# Patient Record
Sex: FEMALE | Race: WHITE | NOT HISPANIC OR LATINO | Employment: FULL TIME | URBAN - NONMETROPOLITAN AREA
[De-identification: names, ages, dates, MRNs, and addresses within clinical notes are randomized per-mention and may not be internally consistent; named-entity substitution may affect disease eponyms.]

---

## 2020-03-27 ENCOUNTER — APPOINTMENT (EMERGENCY)
Dept: RADIOLOGY | Facility: HOSPITAL | Age: 40
End: 2020-03-27
Payer: COMMERCIAL

## 2020-03-27 ENCOUNTER — HOSPITAL ENCOUNTER (EMERGENCY)
Facility: HOSPITAL | Age: 40
Discharge: HOME | End: 2020-03-27
Attending: EMERGENCY MEDICINE | Admitting: EMERGENCY MEDICINE
Payer: COMMERCIAL

## 2020-03-27 VITALS
HEART RATE: 86 BPM | DIASTOLIC BLOOD PRESSURE: 78 MMHG | OXYGEN SATURATION: 99 % | RESPIRATION RATE: 18 BRPM | WEIGHT: 230 LBS | HEIGHT: 66 IN | BODY MASS INDEX: 36.96 KG/M2 | TEMPERATURE: 97.8 F | SYSTOLIC BLOOD PRESSURE: 128 MMHG

## 2020-03-27 DIAGNOSIS — S82.832A CLOSED FRACTURE OF HEAD OF LEFT FIBULA, INITIAL ENCOUNTER: ICD-10-CM

## 2020-03-27 DIAGNOSIS — S82.892A CLOSED FRACTURE OF LEFT ANKLE, INITIAL ENCOUNTER: Primary | ICD-10-CM

## 2020-03-27 PROCEDURE — 73610 X-RAY EXAM OF ANKLE: CPT

## 2020-03-27 PROCEDURE — 73564 X-RAY EXAM KNEE 4 OR MORE: CPT

## 2020-03-27 PROCEDURE — 29515 APPLICATION SHORT LEG SPLINT: CPT | Performed by: EMERGENCY MEDICINE

## 2020-03-27 PROCEDURE — 99283 EMERGENCY DEPT VISIT LOW MDM: CPT

## 2020-03-27 PROCEDURE — 73630 X-RAY EXAM OF FOOT: CPT

## 2020-03-27 PROCEDURE — 99283 EMERGENCY DEPT VISIT LOW MDM: CPT | Performed by: EMERGENCY MEDICINE

## 2020-03-27 RX ORDER — BIOTIN 10 MG
TABLET ORAL
COMMUNITY

## 2020-03-27 RX ORDER — FLUTICASONE PROPIONATE 50 MCG
SPRAY, SUSPENSION (ML) NASAL
COMMUNITY
Start: 2020-02-04

## 2020-03-27 RX ORDER — FOLIC ACID 0.8 MG
500 TABLET ORAL
COMMUNITY

## 2020-03-27 RX ORDER — PROPRANOLOL HYDROCHLORIDE 20 MG/1
20 TABLET ORAL
COMMUNITY
Start: 2020-01-15

## 2020-03-27 RX ORDER — SUMATRIPTAN 6 MG/.5ML
INJECTION, SOLUTION SUBCUTANEOUS
COMMUNITY

## 2020-03-28 NOTE — ED PROVIDER NOTES
History  Chief Complaint   Patient presents with    Ankle Injury     pt  fell down 6 wooden steps tonight, denies LOC, denies hitting head, pt  was walking down steps and she felt her ankle gave out, causing her to slid down steps with her right leg underneat her, hitting her knee and shin off of steps, pain to inside of foot and achilles, unable to bear weight, swollen, no obvious defomity, pt  took 4 tabs of 500 mg acetaminophen ES prior to arrival     Patient is a 68-year-old female presents the emergency department after she tripped and fell while walking down steps twisting her left knee and injuring her left ankle and foot patient denies any hit on head or loss of consciousness no other injury  History provided by:  Patient  Fall   Mechanism of injury: fall    Injury location:  Leg  Leg injury location:  L knee, L ankle and L foot  Time since incident:  1 hour  Fall:     Fall occurred:  Down stairs    Entrapped after fall: no    Suspicion of alcohol use: no    Suspicion of drug use: no    Prior to arrival data:     Responsiveness at scene:  Alert    Orientation at scene:  Person, place, situation and time    Loss of consciousness: no      Amnesic to event: no    Associated symptoms: no abdominal pain, no chest pain, no headaches, no nausea and no vomiting        Prior to Admission Medications   Prescriptions Last Dose Informant Patient Reported? Taking?    Echinacea-Goldenseal (ECHINACEA COMB/REHMAN SEAL) CAPS   Yes No   Sig: Take by mouth   Magnesium 500 MG CAPS   Yes No   Sig: Take 500 mg by mouth   Multiple Vitamins-Minerals (MULTIVITAMIN ADULT) CHEW   Yes No   Sig: Chew   Riboflavin 100 MG CAPS   Yes No   Sig: Take 100 mg by mouth   SUMAtriptan (Imitrex) 6 mg/0 5 mL   Yes No   fluticasone (FLONASE) 50 mcg/act nasal spray   Yes Yes   Sig: SPRAY 2 SPRAYS INTO EACH NOSTRIL EVERY DAY   propranolol (INDERAL) 20 mg tablet   Yes Yes   Sig: Take 20 mg by mouth      Facility-Administered Medications: None Past Medical History:   Diagnosis Date    Hypertension     Migraines        Past Surgical History:   Procedure Laterality Date    TUBAL LIGATION         History reviewed  No pertinent family history  I have reviewed and agree with the history as documented  E-Cigarette/Vaping    E-Cigarette Use Never User      E-Cigarette/Vaping Substances    Nicotine No     THC No     CBD No     Flavoring No     Other No      Social History     Tobacco Use    Smoking status: Never Smoker    Smokeless tobacco: Never Used   Substance Use Topics    Alcohol use: Yes     Frequency: Monthly or less     Drinks per session: 1 or 2     Binge frequency: Never    Drug use: Never       Review of Systems   Constitutional: Negative for activity change, appetite change, chills, fatigue and fever  HENT: Negative for congestion, ear pain, rhinorrhea and sore throat  Eyes: Negative for discharge, redness and visual disturbance  Respiratory: Negative for cough, chest tightness, shortness of breath and wheezing  Cardiovascular: Negative for chest pain and palpitations  Gastrointestinal: Negative for abdominal pain, constipation, diarrhea, nausea and vomiting  Endocrine: Negative for polydipsia and polyuria  Genitourinary: Negative for difficulty urinating, dysuria, frequency, hematuria and urgency  Musculoskeletal: Negative for arthralgias and myalgias  Left ankle foot and knee pain   Skin: Negative for color change, pallor and rash  Neurological: Negative for dizziness, weakness, light-headedness, numbness and headaches  Hematological: Negative for adenopathy  Does not bruise/bleed easily  All other systems reviewed and are negative  Physical Exam  Physical Exam   Constitutional: She is oriented to person, place, and time  She appears well-developed and well-nourished  HENT:   Head: Normocephalic and atraumatic     Right Ear: External ear normal    Left Ear: External ear normal    Nose: Nose normal    Mouth/Throat: Oropharynx is clear and moist    Eyes: Pupils are equal, round, and reactive to light  Conjunctivae and EOM are normal    Neck: Normal range of motion  Neck supple  Cardiovascular: Normal rate, regular rhythm, normal heart sounds and intact distal pulses  Pulmonary/Chest: Effort normal and breath sounds normal  No respiratory distress  She has no wheezes  She has no rales  She exhibits no tenderness  Abdominal: Soft  Bowel sounds are normal  She exhibits no distension  There is no tenderness  There is no guarding  Musculoskeletal: Normal range of motion  Left knee: She exhibits normal range of motion and no deformity  Tenderness found  Left ankle: She exhibits swelling  She exhibits normal pulse  Tenderness  Lateral malleolus and medial malleolus tenderness found  Left foot: There is tenderness  There is normal capillary refill and no deformity  Neurological: She is alert and oriented to person, place, and time  No cranial nerve deficit or sensory deficit  Skin: Skin is warm and dry  Psychiatric: She has a normal mood and affect  Nursing note and vitals reviewed        Vital Signs  ED Triage Vitals [03/27/20 2042]   Temperature Pulse Respirations Blood Pressure SpO2   (!) 97 °F (36 1 °C) 97 18 135/95 97 %      Temp Source Heart Rate Source Patient Position - Orthostatic VS BP Location FiO2 (%)   Temporal Monitor Sitting Right arm --      Pain Score       8           Vitals:    03/27/20 2042   BP: 135/95   Pulse: 97   Patient Position - Orthostatic VS: Sitting         Visual Acuity      ED Medications  Medications - No data to display    Diagnostic Studies  Results Reviewed     None                 XR ankle 3+ views LEFT   ED Interpretation by Emmett Guerra DO (03/27 2137)   Subtle avulsion fracture posterior tibial malleolus      XR foot 3+ views LEFT   ED Interpretation by Emmett Guerra DO (03/27 2137)   No fracture of foot      XR knee 4+ views left injury   ED Interpretation by Jerry David DO (03/27 2137)   Avulsion fracture proximal left fibular head                 Procedures  Splint application  Date/Time: 3/27/2020 9:41 PM  Performed by: Jerry David DO  Authorized by: Jerry David DO     Patient location:  ED  Performing Provider:  Attending  Other Assisting Provider: No    Consent:     Consent obtained:  Verbal    Consent given by:  Patient    Risks discussed:  Discoloration, numbness, pain and swelling  Universal protocol:     Procedure explained and questions answered to patient or proxy's satisfaction: yes      Patient identity confirmed:  Verbally with patient  Indication:     Indications: fracture    Pre-procedure details:     Sensation:  Normal  Procedure details:     Laterality:  Left    Location:  Leg    Leg:  L lower leg    Splint type:  Short leg    Supplies:  Ortho-Glass, cotton padding and elastic bandage  Post-procedure details:     Pain:  Improved    Sensation:  Normal    Neurovascular Exam: skin pink, capillary refill <2 sec, normal pulses and skin intact, warm, and dry      Patient tolerance of procedure: Tolerated well, no immediate complications             ED Course                                 MDM  Number of Diagnoses or Management Options  Closed fracture of head of left fibula, initial encounter: new and requires workup  Closed fracture of left ankle, initial encounter: new and requires workup  Diagnosis management comments: X-rays reveal fracture of the posterior malleolus of the left tibia as well as an avulsion at the proximal head of the left fibula  Patient is neurovascularly intact distal to injuries  Patient placed in posterior short-leg splint advised no weight-bearing on the left leg and supportive care and prompt follow-up with Orthopedics for further evaluation and treatment and obtain test results return precautions and anticipatory guidance discussed           Amount and/or Complexity of Data Reviewed  Tests in the radiology section of CPT®: ordered and reviewed  Independent visualization of images, tracings, or specimens: yes    Risk of Complications, Morbidity, and/or Mortality  Presenting problems: low  Diagnostic procedures: low  Management options: low    Patient Progress  Patient progress: stable        Disposition  Final diagnoses:   Closed fracture of left ankle, initial encounter - Posterior tibial malleolus   Closed fracture of head of left fibula, initial encounter     Time reflects when diagnosis was documented in both MDM as applicable and the Disposition within this note     Time User Action Codes Description Comment    3/27/2020  9:38 PM Jessica Albert Add [S68 623H] Closed fracture of left ankle, initial encounter     3/27/2020  9:38 PM Jessica Albert Modify [M98 124Y] Closed fracture of left ankle, initial encounter Posterior tibial malleolus    3/27/2020  9:38 PM Jessica Albert Add [X38 192P] Closed fracture of head of left fibula, initial encounter       ED Disposition     ED Disposition Condition Date/Time Comment    Discharge Stable Fri Mar 27, 2020  9:37 PM Louise Palencia discharge to home/self care              Follow-up Information     Follow up With Specialties Details Why Contact Info    Vickie Ratliff DO Family Medicine Schedule an appointment as soon as possible for a visit in 1 week  [address] Orthopedic Surgery Schedule an appointment as soon as possible for a visit in 3 days  [address] [facility] [phone]            Patient's Medications   Discharge Prescriptions    No medications on file         PDMP Review     None          ED Provider  Electronically Signed by           Leatha Bran DO  03/27/20 0762

## 2020-03-30 ENCOUNTER — OFFICE VISIT (OUTPATIENT)
Dept: OBGYN CLINIC | Facility: CLINIC | Age: 40
End: 2020-03-30
Payer: COMMERCIAL

## 2020-03-30 VITALS
BODY MASS INDEX: 37.12 KG/M2 | HEIGHT: 66 IN | DIASTOLIC BLOOD PRESSURE: 84 MMHG | SYSTOLIC BLOOD PRESSURE: 141 MMHG | TEMPERATURE: 98.3 F | HEART RATE: 78 BPM

## 2020-03-30 DIAGNOSIS — S82.392A FRACTURE, POSTERIOR MALLEOLUS, LEFT, CLOSED, INITIAL ENCOUNTER: ICD-10-CM

## 2020-03-30 DIAGNOSIS — S82.832A CLOSED FRACTURE OF HEAD OF LEFT FIBULA, INITIAL ENCOUNTER: ICD-10-CM

## 2020-03-30 DIAGNOSIS — M25.572 ACUTE LEFT ANKLE PAIN: Primary | ICD-10-CM

## 2020-03-30 DIAGNOSIS — M25.562 ACUTE PAIN OF LEFT KNEE: ICD-10-CM

## 2020-03-30 PROCEDURE — 99204 OFFICE O/P NEW MOD 45 MIN: CPT | Performed by: ORTHOPAEDIC SURGERY

## 2020-03-30 PROCEDURE — 27767 CLTX POST ANKLE FX: CPT | Performed by: ORTHOPAEDIC SURGERY

## 2020-03-30 NOTE — PROGRESS NOTES
ASSESSMENT/PLAN:    Diagnoses and all orders for this visit:    Acute left ankle pain    Acute pain of left knee    Fracture, posterior malleolus, left, closed, initial encounter    Closed fracture of head of left fibula, initial encounter        Plan:  I discussed treatment options  I think a cast would be appropriate at this time and she is in agreement that she would not likely be compliant with a walker cast boot  I would permit weight-bearing as tolerated, using crutches if needed  I will see her in 1 week for re-evaluation with repeat x-rays of her ankle in the AP and lateral planes to ensure the fracture remains nondisplaced  She was reassured that the proximal fibular fracture typically would be treated with an Ace bandage and activities as tolerated  I encouraged her to contact me if questions or concerns were to arise  Return in about 1 week (around 4/6/2020)  _____________________________________________________  CHIEF COMPLAINT:  Chief Complaint   Patient presents with    Left Lower Leg - Pain, Fracture         SUBJECTIVE:  Yumi Alcala is a 44y o  year old female who presents for evaluation of her left lower extremity  She injured herself falling down stairs at home on 03/27/2020  She was unable to tolerate weight-bearing after the fall and was seen in the emergency room at [facility], x-rays were obtained and she was placed into a posterior splint  She denies any history of prior injuries to her left ankle  She does recall a left knee injury and was treated for an ACL tear and medial collateral ligament injury  She denies any left lower extremity paresthesias at this time  She denies any additional injuries other than an area of some ecchymosis over the right proximal thigh and buttock  However, she denies any pain to this location        PAST MEDICAL HISTORY:  Past Medical History:   Diagnosis Date    Hypertension     Migraines        PAST SURGICAL HISTORY:  Past Surgical History:   Procedure Laterality Date    TUBAL LIGATION         FAMILY HISTORY:  History reviewed  No pertinent family history  SOCIAL HISTORY:  Social History     Tobacco Use    Smoking status: Never Smoker    Smokeless tobacco: Never Used   Substance Use Topics    Alcohol use: Yes     Frequency: Monthly or less     Drinks per session: 1 or 2     Binge frequency: Never    Drug use: Never       MEDICATIONS:    Current Outpatient Medications:     Echinacea-Goldenseal (ECHINACEA COMB/REHMAN SEAL) CAPS, Take by mouth, Disp: , Rfl:     fluticasone (FLONASE) 50 mcg/act nasal spray, SPRAY 2 SPRAYS INTO EACH NOSTRIL EVERY DAY, Disp: , Rfl:     Magnesium 500 MG CAPS, Take 500 mg by mouth, Disp: , Rfl:     Multiple Vitamins-Minerals (MULTIVITAMIN ADULT) CHEW, Chew, Disp: , Rfl:     propranolol (INDERAL) 20 mg tablet, Take 20 mg by mouth, Disp: , Rfl:     Riboflavin 100 MG CAPS, Take 100 mg by mouth, Disp: , Rfl:     SUMAtriptan (Imitrex) 6 mg/0 5 mL, , Disp: , Rfl:     ALLERGIES:  No Known Allergies    Review of systems:   Constitutional: Negative for fatigue, fever or loss of apetite  HENT: Negative  Respiratory: Negative for shortness of breath, dyspnea  Cardiovascular: Negative for chest pain/tightness  Gastrointestinal: Negative for abdominal pain, N/V  Endocrine: Negative for cold/heat intolerance, unexplained weight loss/gain  Genitourinary: Negative for flank pain, dysuria, hematuria  Musculoskeletal:  Positive as in the HPI   Skin: Negative for rash  Neurological:  Negative  Psychiatric/Behavioral: Negative for agitation  _____________________________________________________  PHYSICAL EXAMINATION:    Blood pressure 141/84, pulse 78, temperature 98 3 °F (36 8 °C), height 5' 6" (1 676 m)      General: well developed and well nourished, alert, oriented times 3 and appears comfortable  Psychiatric: Normal  HEENT: Benign  Cardiovascular: Regular    Pulmonary: No wheezing or stridor  Abdomen: Soft, Nontender  Skin: No masses, erythema, lacerations, fluctation, ulcerations  Neurovascular: Motor and sensory exams are grossly intact and pulses are palpable  MUSCULOSKELETAL EXAMINATION:    The left lower extremity exam demonstrates splint in place upon arrival   This was removed without difficulty  There is no significant swelling, no ecchymosis and no lacerations or abrasions noted  She has tenderness over the anterior portion of the deltoid ligament as well as over the anterior talofibular ligament  There is mild tenderness over the posterior ankle  The anterior aspect of the distal tibia is nontender  Ankle range of motion does trigger pain, especially into plantar flexion  She has good subtalar and forefoot motion without complaints and the bony and soft tissue structures throughout the foot are nontender  She denies any tenderness to palpation of the fibular shaft or proximal fibula  The medial and lateral joint lines are nontender and the femoral condyles and tibial plateau with a left knee are nontender  Valgus and varus stress does elicit some tenderness localized to the proximal left fibula  However, there is no laxity noted  _____________________________________________________  STUDIES REVIEWED:  X-rays of the knee, ankle and foot were reviewed  X-rays of the knee demonstrated fracture of the left fibular head  X-rays of the ankle and foot demonstrate evidence of a posterior malleolus fracture without displacement  The reports were reviewed      PROCEDURES:  Fracture / Dislocation Treatment  Date/Time: 3/30/2020 2:47 PM  Performed by: Hailee Moore  Authorized by: Hailee Moore     Patient Location:  Clinic  Verbal consent obtained?: Yes    Written consent obtained?: No    Risks and benefits: Risks, benefits and alternatives were discussed    Consent given by:  Patient  Patient states understanding of procedure being performed: Yes    Test results available and properly labeled: Yes    Radiology Images displayed and confirmed   If images not available, report reviewed: Yes    Injury location:  Ankle  Location details:  Left ankle  Injury type:  Fracture  Fracture type: posterior malleolus    Neurovascular status: Neurovascularly intact    Local anesthesia used?: No    Manipulation performed?: No    Cast type:  Short leg walking  Patient tolerance:  Patient tolerated the procedure well with no immediate complications          Sharath Armijo

## 2020-04-06 ENCOUNTER — APPOINTMENT (OUTPATIENT)
Dept: RADIOLOGY | Facility: CLINIC | Age: 40
End: 2020-04-06
Payer: COMMERCIAL

## 2020-04-06 ENCOUNTER — OFFICE VISIT (OUTPATIENT)
Dept: OBGYN CLINIC | Facility: CLINIC | Age: 40
End: 2020-04-06

## 2020-04-06 VITALS
TEMPERATURE: 97.6 F | SYSTOLIC BLOOD PRESSURE: 143 MMHG | DIASTOLIC BLOOD PRESSURE: 87 MMHG | HEIGHT: 66 IN | BODY MASS INDEX: 37.12 KG/M2 | HEART RATE: 81 BPM

## 2020-04-06 DIAGNOSIS — S82.392A FRACTURE, POSTERIOR MALLEOLUS, LEFT, CLOSED, INITIAL ENCOUNTER: ICD-10-CM

## 2020-04-06 DIAGNOSIS — S82.392D FRACTURE, POSTERIOR MALLEOLUS, LEFT, CLOSED, WITH ROUTINE HEALING, SUBSEQUENT ENCOUNTER: Primary | ICD-10-CM

## 2020-04-06 DIAGNOSIS — S82.832D CLOSED FRACTURE OF PROXIMAL END OF LEFT FIBULA WITH ROUTINE HEALING, UNSPECIFIED FRACTURE MORPHOLOGY, SUBSEQUENT ENCOUNTER: ICD-10-CM

## 2020-04-06 PROCEDURE — 73610 X-RAY EXAM OF ANKLE: CPT

## 2020-04-06 PROCEDURE — 99024 POSTOP FOLLOW-UP VISIT: CPT | Performed by: ORTHOPAEDIC SURGERY

## 2020-05-11 ENCOUNTER — OFFICE VISIT (OUTPATIENT)
Dept: OBGYN CLINIC | Facility: CLINIC | Age: 40
End: 2020-05-11

## 2020-05-11 ENCOUNTER — APPOINTMENT (OUTPATIENT)
Dept: RADIOLOGY | Facility: CLINIC | Age: 40
End: 2020-05-11
Payer: COMMERCIAL

## 2020-05-11 VITALS
HEART RATE: 72 BPM | SYSTOLIC BLOOD PRESSURE: 127 MMHG | DIASTOLIC BLOOD PRESSURE: 84 MMHG | HEIGHT: 66 IN | BODY MASS INDEX: 39.31 KG/M2 | RESPIRATION RATE: 18 BRPM | TEMPERATURE: 98.8 F | WEIGHT: 244.6 LBS

## 2020-05-11 DIAGNOSIS — S82.392D FRACTURE, POSTERIOR MALLEOLUS, LEFT, CLOSED, WITH ROUTINE HEALING, SUBSEQUENT ENCOUNTER: Primary | ICD-10-CM

## 2020-05-11 DIAGNOSIS — S82.832D CLOSED FRACTURE OF HEAD OF LEFT FIBULA WITH ROUTINE HEALING, SUBSEQUENT ENCOUNTER: ICD-10-CM

## 2020-05-11 DIAGNOSIS — S82.392D FRACTURE, POSTERIOR MALLEOLUS, LEFT, CLOSED, WITH ROUTINE HEALING, SUBSEQUENT ENCOUNTER: ICD-10-CM

## 2020-05-11 PROBLEM — M25.572 ACUTE LEFT ANKLE PAIN: Status: RESOLVED | Noted: 2020-03-30 | Resolved: 2020-05-11

## 2020-05-11 PROBLEM — M25.562 ACUTE PAIN OF LEFT KNEE: Status: RESOLVED | Noted: 2020-03-30 | Resolved: 2020-05-11

## 2020-05-11 PROCEDURE — 73610 X-RAY EXAM OF ANKLE: CPT

## 2020-05-11 PROCEDURE — 99024 POSTOP FOLLOW-UP VISIT: CPT | Performed by: ORTHOPAEDIC SURGERY

## 2020-05-11 PROCEDURE — 73560 X-RAY EXAM OF KNEE 1 OR 2: CPT

## 2020-05-29 ENCOUNTER — TELEPHONE (OUTPATIENT)
Dept: OBGYN CLINIC | Facility: HOSPITAL | Age: 40
End: 2020-05-29

## 2020-05-29 DIAGNOSIS — S82.392D FRACTURE, POSTERIOR MALLEOLUS, LEFT, CLOSED, WITH ROUTINE HEALING, SUBSEQUENT ENCOUNTER: Primary | ICD-10-CM

## 2020-05-29 DIAGNOSIS — S82.832D CLOSED FRACTURE OF HEAD OF LEFT FIBULA WITH ROUTINE HEALING, SUBSEQUENT ENCOUNTER: ICD-10-CM

## 2020-06-02 ENCOUNTER — EVALUATION (OUTPATIENT)
Dept: PHYSICAL THERAPY | Facility: CLINIC | Age: 40
End: 2020-06-02
Payer: COMMERCIAL

## 2020-06-02 DIAGNOSIS — S82.839D: ICD-10-CM

## 2020-06-02 DIAGNOSIS — M25.572 ACUTE LEFT ANKLE PAIN: ICD-10-CM

## 2020-06-02 DIAGNOSIS — S82.392D FRACTURE, POSTERIOR MALLEOLUS, LEFT, CLOSED, WITH ROUTINE HEALING, SUBSEQUENT ENCOUNTER: Primary | ICD-10-CM

## 2020-06-02 DIAGNOSIS — R26.2 DIFFICULTY WALKING: ICD-10-CM

## 2020-06-02 PROCEDURE — 97162 PT EVAL MOD COMPLEX 30 MIN: CPT | Performed by: PHYSICAL THERAPIST

## 2020-06-02 PROCEDURE — 97535 SELF CARE MNGMENT TRAINING: CPT | Performed by: PHYSICAL THERAPIST

## 2020-06-04 ENCOUNTER — APPOINTMENT (OUTPATIENT)
Dept: PHYSICAL THERAPY | Facility: CLINIC | Age: 40
End: 2020-06-04
Payer: COMMERCIAL

## 2020-06-04 ENCOUNTER — OFFICE VISIT (OUTPATIENT)
Dept: PHYSICAL THERAPY | Facility: CLINIC | Age: 40
End: 2020-06-04
Payer: COMMERCIAL

## 2020-06-04 DIAGNOSIS — R26.2 DIFFICULTY WALKING: ICD-10-CM

## 2020-06-04 DIAGNOSIS — M25.572 ACUTE LEFT ANKLE PAIN: ICD-10-CM

## 2020-06-04 DIAGNOSIS — S82.839D: ICD-10-CM

## 2020-06-04 DIAGNOSIS — S82.392D FRACTURE, POSTERIOR MALLEOLUS, LEFT, CLOSED, WITH ROUTINE HEALING, SUBSEQUENT ENCOUNTER: Primary | ICD-10-CM

## 2020-06-04 PROCEDURE — 97110 THERAPEUTIC EXERCISES: CPT | Performed by: PHYSICAL THERAPIST

## 2020-06-04 PROCEDURE — 97116 GAIT TRAINING THERAPY: CPT | Performed by: PHYSICAL THERAPIST

## 2020-06-04 PROCEDURE — 97112 NEUROMUSCULAR REEDUCATION: CPT | Performed by: PHYSICAL THERAPIST

## 2020-06-04 PROCEDURE — 97140 MANUAL THERAPY 1/> REGIONS: CPT | Performed by: PHYSICAL THERAPIST

## 2020-06-08 ENCOUNTER — OFFICE VISIT (OUTPATIENT)
Dept: PHYSICAL THERAPY | Facility: CLINIC | Age: 40
End: 2020-06-08
Payer: COMMERCIAL

## 2020-06-08 DIAGNOSIS — S82.839D: ICD-10-CM

## 2020-06-08 DIAGNOSIS — M25.572 ACUTE LEFT ANKLE PAIN: ICD-10-CM

## 2020-06-08 DIAGNOSIS — R26.2 DIFFICULTY WALKING: ICD-10-CM

## 2020-06-08 DIAGNOSIS — S82.392D FRACTURE, POSTERIOR MALLEOLUS, LEFT, CLOSED, WITH ROUTINE HEALING, SUBSEQUENT ENCOUNTER: Primary | ICD-10-CM

## 2020-06-08 PROCEDURE — 97112 NEUROMUSCULAR REEDUCATION: CPT

## 2020-06-08 PROCEDURE — 97110 THERAPEUTIC EXERCISES: CPT

## 2020-06-08 PROCEDURE — 97140 MANUAL THERAPY 1/> REGIONS: CPT

## 2020-06-09 ENCOUNTER — APPOINTMENT (OUTPATIENT)
Dept: PHYSICAL THERAPY | Facility: CLINIC | Age: 40
End: 2020-06-09
Payer: COMMERCIAL

## 2020-06-10 ENCOUNTER — APPOINTMENT (OUTPATIENT)
Dept: PHYSICAL THERAPY | Facility: CLINIC | Age: 40
End: 2020-06-10
Payer: COMMERCIAL

## 2020-06-11 ENCOUNTER — APPOINTMENT (OUTPATIENT)
Dept: PHYSICAL THERAPY | Facility: CLINIC | Age: 40
End: 2020-06-11
Payer: COMMERCIAL

## 2020-08-27 ENCOUNTER — APPOINTMENT (EMERGENCY)
Dept: CT IMAGING | Facility: HOSPITAL | Age: 40
End: 2020-08-27
Payer: COMMERCIAL

## 2020-08-27 ENCOUNTER — HOSPITAL ENCOUNTER (EMERGENCY)
Facility: HOSPITAL | Age: 40
Discharge: HOME | End: 2020-08-28
Attending: EMERGENCY MEDICINE | Admitting: EMERGENCY MEDICINE
Payer: COMMERCIAL

## 2020-08-27 DIAGNOSIS — R07.89 ATYPICAL CHEST PAIN: Primary | ICD-10-CM

## 2020-08-27 LAB
ALBUMIN SERPL BCP-MCNC: 3.8 G/DL (ref 3.5–5)
ALP SERPL-CCNC: 85 U/L (ref 46–116)
ALT SERPL W P-5'-P-CCNC: 34 U/L (ref 12–78)
ANION GAP SERPL CALCULATED.3IONS-SCNC: 12 MMOL/L (ref 4–13)
AST SERPL W P-5'-P-CCNC: 23 U/L (ref 5–45)
BASOPHILS # BLD AUTO: 0.07 THOUSANDS/ΜL (ref 0–0.1)
BASOPHILS NFR BLD AUTO: 1 % (ref 0–1)
BILIRUB SERPL-MCNC: 0.25 MG/DL (ref 0.2–1)
BUN SERPL-MCNC: 14 MG/DL (ref 5–25)
CALCIUM SERPL-MCNC: 9.5 MG/DL (ref 8.3–10.1)
CHLORIDE SERPL-SCNC: 102 MMOL/L (ref 100–108)
CO2 SERPL-SCNC: 26 MMOL/L (ref 21–32)
CREAT SERPL-MCNC: 1.05 MG/DL (ref 0.6–1.3)
EOSINOPHIL # BLD AUTO: 0.36 THOUSAND/ΜL (ref 0–0.61)
EOSINOPHIL NFR BLD AUTO: 2 % (ref 0–6)
ERYTHROCYTE [DISTWIDTH] IN BLOOD BY AUTOMATED COUNT: 13.4 % (ref 11.6–15.1)
GFR SERPL CREATININE-BSD FRML MDRD: 67 ML/MIN/1.73SQ M
GLUCOSE SERPL-MCNC: 128 MG/DL (ref 65–140)
HCT VFR BLD AUTO: 43.6 % (ref 34.8–46.1)
HGB BLD-MCNC: 14.4 G/DL (ref 11.5–15.4)
IMM GRANULOCYTES # BLD AUTO: 0.06 THOUSAND/UL (ref 0–0.2)
IMM GRANULOCYTES NFR BLD AUTO: 0 % (ref 0–2)
LYMPHOCYTES # BLD AUTO: 3.7 THOUSANDS/ΜL (ref 0.6–4.47)
LYMPHOCYTES NFR BLD AUTO: 24 % (ref 14–44)
MCH RBC QN AUTO: 28.7 PG (ref 26.8–34.3)
MCHC RBC AUTO-ENTMCNC: 33 G/DL (ref 31.4–37.4)
MCV RBC AUTO: 87 FL (ref 82–98)
MONOCYTES # BLD AUTO: 0.91 THOUSAND/ΜL (ref 0.17–1.22)
MONOCYTES NFR BLD AUTO: 6 % (ref 4–12)
NEUTROPHILS # BLD AUTO: 10.08 THOUSANDS/ΜL (ref 1.85–7.62)
NEUTS SEG NFR BLD AUTO: 67 % (ref 43–75)
NRBC BLD AUTO-RTO: 0 /100 WBCS
PLATELET # BLD AUTO: 403 THOUSANDS/UL (ref 149–390)
PMV BLD AUTO: 9.3 FL (ref 8.9–12.7)
POTASSIUM SERPL-SCNC: 3.7 MMOL/L (ref 3.5–5.3)
PROT SERPL-MCNC: 8.4 G/DL (ref 6.4–8.2)
RBC # BLD AUTO: 5.01 MILLION/UL (ref 3.81–5.12)
SARS-COV-2 RNA RESP QL NAA+PROBE: NEGATIVE
SODIUM SERPL-SCNC: 140 MMOL/L (ref 136–145)
TROPONIN I SERPL-MCNC: <0.02 NG/ML
WBC # BLD AUTO: 15.18 THOUSAND/UL (ref 4.31–10.16)

## 2020-08-27 PROCEDURE — 84484 ASSAY OF TROPONIN QUANT: CPT | Performed by: EMERGENCY MEDICINE

## 2020-08-27 PROCEDURE — 71275 CT ANGIOGRAPHY CHEST: CPT

## 2020-08-27 PROCEDURE — 36415 COLL VENOUS BLD VENIPUNCTURE: CPT | Performed by: EMERGENCY MEDICINE

## 2020-08-27 PROCEDURE — 93005 ELECTROCARDIOGRAM TRACING: CPT

## 2020-08-27 PROCEDURE — 87635 SARS-COV-2 COVID-19 AMP PRB: CPT | Performed by: EMERGENCY MEDICINE

## 2020-08-27 PROCEDURE — 99285 EMERGENCY DEPT VISIT HI MDM: CPT | Performed by: EMERGENCY MEDICINE

## 2020-08-27 PROCEDURE — 96360 HYDRATION IV INFUSION INIT: CPT

## 2020-08-27 PROCEDURE — G1004 CDSM NDSC: HCPCS

## 2020-08-27 PROCEDURE — 99285 EMERGENCY DEPT VISIT HI MDM: CPT

## 2020-08-27 PROCEDURE — 80053 COMPREHEN METABOLIC PANEL: CPT | Performed by: EMERGENCY MEDICINE

## 2020-08-27 PROCEDURE — 85025 COMPLETE CBC W/AUTO DIFF WBC: CPT | Performed by: EMERGENCY MEDICINE

## 2020-08-27 RX ORDER — ASPIRIN 81 MG/1
324 TABLET, CHEWABLE ORAL ONCE
Status: COMPLETED | OUTPATIENT
Start: 2020-08-27 | End: 2020-08-27

## 2020-08-27 RX ORDER — NITROGLYCERIN 0.4 MG/1
0.4 TABLET SUBLINGUAL
Status: DISCONTINUED | OUTPATIENT
Start: 2020-08-27 | End: 2020-08-28 | Stop reason: HOSPADM

## 2020-08-27 RX ADMIN — IOHEXOL 85 ML: 350 INJECTION, SOLUTION INTRAVENOUS at 22:42

## 2020-08-27 RX ADMIN — ASPIRIN 81 MG 324 MG: 81 TABLET ORAL at 21:55

## 2020-08-27 RX ADMIN — SODIUM CHLORIDE 1000 ML: 0.9 INJECTION, SOLUTION INTRAVENOUS at 21:50

## 2020-08-28 VITALS
RESPIRATION RATE: 18 BRPM | TEMPERATURE: 98.2 F | SYSTOLIC BLOOD PRESSURE: 152 MMHG | HEART RATE: 79 BPM | BODY MASS INDEX: 38.75 KG/M2 | OXYGEN SATURATION: 96 % | DIASTOLIC BLOOD PRESSURE: 88 MMHG | WEIGHT: 240.08 LBS

## 2020-08-28 LAB
ATRIAL RATE: 107 BPM
P AXIS: 68 DEGREES
PR INTERVAL: 144 MS
QRS AXIS: 78 DEGREES
QRSD INTERVAL: 84 MS
QT INTERVAL: 332 MS
QTC INTERVAL: 443 MS
T WAVE AXIS: 47 DEGREES
TROPONIN I SERPL-MCNC: <0.02 NG/ML
VENTRICULAR RATE: 107 BPM

## 2020-08-28 NOTE — ED CARE HANDOFF
Emergency Department Sign Out Note        Sign out and transfer of care from Dr Verónica Hurt  See Separate Emergency Department note  The patient, Suzan Dewitt, was evaluated by the previous provider for chest pain  Workup Completed:  Labs and imaging as well as EKG    ED Course / Workup Pending (followup):  CTA chest rule out PE, 3 hour troponin      HEART Risk Score      Most Recent Value   Heart Score Risk Calculator   History  0 Filed at: 08/27/2020 2248   ECG  0 Filed at: 08/27/2020 2248   Age  0 Filed at: 08/27/2020 2248   Risk Factors  1 Filed at: 08/27/2020 2248   Troponin  0 Filed at: 08/27/2020 2248   HEART Score  1 Filed at: 08/27/2020 2248           patient resting comfortably, no complaints of present time              ED Course as of Aug 28 0036   Fri Aug 28, 2020   0035 CTA chest unremarkable, repeat troponin unremarkable as well, patient advised of these findings and discharged with instructions to rest, refrain from strenuous exercise or heavy lifting until symptoms are resolved, follow-up with primary care provider or return if symptoms worsen, patient acknowledges understanding and agreement with this plan          Disposition  Final diagnoses:   Atypical chest pain     Time reflects when diagnosis was documented in both MDM as applicable and the Disposition within this note     Time User Action Codes Description Comment    8/28/2020 12:28 AM Janice Garcia Add [R07 [id number]] Atypical chest pain       ED Disposition     ED Disposition Condition Date/Time Comment    Discharge Stable Fri Aug 28, 2020 12:28 AM Suzan Dewitt discharge to home/self care              Follow-up Information     Follow up With Specialties Details Why Patti 89, DO Family Medicine In 3 days  [place]  [phone]          Discharge Medication List as of 8/28/2020 12:28 AM      CONTINUE these medications which have NOT CHANGED    Details   Echinacea-Goldenseal (ECHINACEA COMB/REHMAN SEAL) CAPS Take by mouth, Historical Med      fluticasone (FLONASE) 50 mcg/act nasal spray SPRAY 2 SPRAYS INTO EACH NOSTRIL EVERY DAY, Historical Med      Magnesium 500 MG CAPS Take 500 mg by mouth, Historical Med      Multiple Vitamins-Minerals (MULTIVITAMIN ADULT) CHEW Chew, Historical Med      propranolol (INDERAL) 20 mg tablet Take 20 mg by mouth, Starting Wed 1/15/2020, Historical Med      SUMAtriptan (Imitrex) 6 mg/0 5 mL Historical Med      Riboflavin 100 MG CAPS Take 100 mg by mouth, Historical Med           No discharge procedures on file         ED Provider  Electronically Signed by     Marco A Haney,   08/28/20 7204

## 2020-08-28 NOTE — ED PROVIDER NOTES
History  Chief Complaint   Patient presents with    Chest Pain     Patient has intermittent chest pain that was worse tonight with sob  Patient recently flew to [facility] on 8/12-8/22  Patient complains of intermittent chest pressure over the past 6-7 days  She denies cough or fever or shortness of breath  She denies nausea or vomiting or abdominal pain  She states the pain comes and goes but is worse today  It is substernal pressure with some left-sided radiation  No associated sweats or chills  Patient recently traveled to [facility]  History provided by:  Patient   used: No    Chest Pain   Pain location:  Substernal area  Pain quality: pressure    Pain radiates to:  L arm  Pain radiates to the back: no    Pain severity:  Moderate (4/10)  Onset quality:  Gradual  Duration:  6 days  Timing:  Intermittent  Progression:  Unchanged  Chronicity:  New  Relieved by:  Nothing  Worsened by:  Nothing tried  Ineffective treatments:  None tried  Associated symptoms: no abdominal pain, no altered mental status, no anxiety, no back pain, no claudication, no cough, no diaphoresis, no dizziness, no dysphagia, no fatigue, no fever, no headache, no heartburn, no lower extremity edema, no nausea, no near-syncope, no numbness, no palpitations, no shortness of breath, no syncope and not vomiting        Prior to Admission Medications   Prescriptions Last Dose Informant Patient Reported? Taking?    Echinacea-Goldenseal (ECHINACEA COMB/REHMAN SEAL) CAPS   Yes Yes   Sig: Take by mouth   Magnesium 500 MG CAPS   Yes Yes   Sig: Take 500 mg by mouth   Multiple Vitamins-Minerals (MULTIVITAMIN ADULT) CHEW   Yes Yes   Sig: Chew   Riboflavin 100 MG CAPS Not Taking at Unknown time  Yes No   Sig: Take 100 mg by mouth   SUMAtriptan (Imitrex) 6 mg/0 5 mL   Yes Yes   fluticasone (FLONASE) 50 mcg/act nasal spray   Yes Yes   Sig: SPRAY 2 SPRAYS INTO EACH NOSTRIL EVERY DAY   propranolol (INDERAL) 20 mg tablet   Yes Yes Sig: Take 20 mg by mouth      Facility-Administered Medications: None       Past Medical History:   Diagnosis Date    Hypertension     Migraines        Past Surgical History:   Procedure Laterality Date    TUBAL LIGATION         Family History   Problem Relation Age of Onset    Heart disease Mother     Hypertension Mother     Hyperlipidemia Mother     Hodgkin's lymphoma Father      I have reviewed and agree with the history as documented  E-Cigarette/Vaping    E-Cigarette Use Never User      E-Cigarette/Vaping Substances    Nicotine No     THC No     CBD No     Flavoring No     Other No      Social History     Tobacco Use    Smoking status: Never Smoker    Smokeless tobacco: Never Used   Substance Use Topics    Alcohol use: Not Currently     Frequency: Monthly or less     Drinks per session: 1 or 2     Binge frequency: Never    Drug use: Never       Review of Systems   Constitutional: Negative for chills, diaphoresis, fatigue and fever  HENT: Negative for ear pain, hearing loss, sore throat, trouble swallowing and voice change  Eyes: Negative for pain and discharge  Respiratory: Negative for cough, shortness of breath and wheezing  Cardiovascular: Positive for chest pain  Negative for palpitations, claudication, syncope and near-syncope  Gastrointestinal: Negative for abdominal pain, blood in stool, constipation, diarrhea, heartburn, nausea and vomiting  Genitourinary: Negative for dysuria, flank pain, frequency and hematuria  Musculoskeletal: Negative for back pain, joint swelling, neck pain and neck stiffness  Skin: Negative for rash and wound  Neurological: Negative for dizziness, seizures, syncope, facial asymmetry, numbness and headaches  Psychiatric/Behavioral: Negative for hallucinations, self-injury and suicidal ideas  All other systems reviewed and are negative  Physical Exam  Physical Exam  Vitals signs and nursing note reviewed     Constitutional: General: She is not in acute distress  Appearance: She is well-developed  HENT:      Head: Normocephalic and atraumatic  Right Ear: External ear normal       Left Ear: External ear normal    Eyes:      Conjunctiva/sclera: Conjunctivae normal       Pupils: Pupils are equal, round, and reactive to light  Neck:      Musculoskeletal: Normal range of motion and neck supple  Cardiovascular:      Rate and Rhythm: Regular rhythm  Tachycardia present  Heart sounds: Normal heart sounds  No murmur  Pulmonary:      Effort: Pulmonary effort is normal       Breath sounds: Normal breath sounds  No wheezing or rales  Abdominal:      General: Bowel sounds are normal  There is no distension  Palpations: Abdomen is soft  Tenderness: There is no abdominal tenderness  There is no guarding or rebound  Musculoskeletal: Normal range of motion  General: No deformity  Skin:     General: Skin is warm and dry  Findings: No rash  Neurological:      General: No focal deficit present  Mental Status: She is alert and oriented to person, place, and time  Cranial Nerves: No cranial nerve deficit     Psychiatric:         Behavior: Behavior normal          Vital Signs  ED Triage Vitals [08/27/20 2139]   Temperature Pulse Respirations Blood Pressure SpO2   98 °F (36 7 °C) (!) 117 18 137/98 97 %      Temp Source Heart Rate Source Patient Position - Orthostatic VS BP Location FiO2 (%)   Temporal Monitor Lying Right arm --      Pain Score       4           Vitals:    08/27/20 2139 08/27/20 2200 08/27/20 2245   BP: 137/98 118/90 (!) 166/101   Pulse: (!) 117 104 94   Patient Position - Orthostatic VS: Lying Sitting Sitting         Visual Acuity      ED Medications  Medications   nitroglycerin (NITROSTAT) SL tablet 0 4 mg (has no administration in time range)   sodium chloride 0 9 % bolus 1,000 mL (0 mL Intravenous Stopped 8/27/20 2250)   aspirin chewable tablet 324 mg (324 mg Oral Given 8/27/20 2155)   iohexol (OMNIPAQUE) 350 MG/ML injection (SINGLE-DOSE) 100 mL (85 mL Intravenous Given 8/27/20 2242)       Diagnostic Studies  Results Reviewed     Procedure Component Value Units Date/Time    Novel Coronavirus [facility] [[id number]]  (Normal) Collected:  08/27/20 2147    Lab Status:  Final result Specimen:  Nares from Nose Updated:  08/27/20 2257     SARS-CoV-2 Negative    Narrative: The specimen collection materials, transport medium, and/or testing methodology utilized in the production of these test results have been proven to be reliable in a limited validation with an abbreviated program under the Emergency Utilization Authorization provided by the FDA  Testing reported as "Presumptive positive" will be confirmed with secondary testing with a reference laboratory to ensure result accuracy  Clinical caution and judgement should be used with the interpretation of these results with consideration of the clinical impression and other laboratory testing  Testing reported as "Positive" or "Negative" has been proven to be accurate according to standard laboratory validation requirements  All testing is performed with control materials showing appropriate reactivity at standard intervals        Troponin I [[id number]]     Lab Status:  No result Specimen:  Blood     Troponin I [[id number]]  (Normal) Collected:  08/27/20 2147    Lab Status:  Final result Specimen:  Blood from Arm, Left Updated:  08/27/20 2217     Troponin I <0 02 ng/mL     Comprehensive metabolic panel [[id number]]  (Abnormal) Collected:  08/27/20 2147    Lab Status:  Final result Specimen:  Blood from Arm, Left Updated:  08/27/20 2213     Sodium 140 mmol/L      Potassium 3 7 mmol/L      Chloride 102 mmol/L      CO2 26 mmol/L      ANION GAP 12 mmol/L      BUN 14 mg/dL      Creatinine 1 05 mg/dL      Glucose 128 mg/dL      Calcium 9 5 mg/dL      AST 23 U/L      ALT 34 U/L      Alkaline Phosphatase 85 U/L      Total Protein 8 4 g/dL Albumin 3 8 g/dL      Total Bilirubin 0 25 mg/dL      eGFR 67 ml/min/1 73sq m     Narrative:       [place] guidelines for Chronic Kidney Disease (CKD):     Stage 1 with normal or high GFR (GFR > 90 mL/min/1 73 square meters)    Stage 2 Mild CKD (GFR = 60-89 mL/min/1 73 square meters)    Stage 3A Moderate CKD (GFR = 45-59 mL/min/1 73 square meters)    Stage 3B Moderate CKD (GFR = 30-44 mL/min/1 73 square meters)    Stage 4 Severe CKD (GFR = 15-29 mL/min/1 73 square meters)    Stage 5 End Stage CKD (GFR <15 mL/min/1 73 square meters)  Note: GFR calculation is accurate only with a steady state creatinine    CBC and differential [[id number]]  (Abnormal) Collected:  08/27/20 2147    Lab Status:  Final result Specimen:  Blood from Arm, Left Updated:  08/27/20 2157     WBC 15 18 Thousand/uL      RBC 5 01 Million/uL      Hemoglobin 14 4 g/dL      Hematocrit 43 6 %      MCV 87 fL      MCH 28 7 pg      MCHC 33 0 g/dL      RDW 13 4 %      MPV 9 3 fL      Platelets 838 Thousands/uL      nRBC 0 /100 WBCs      Neutrophils Relative 67 %      Immat GRANS % 0 %      Lymphocytes Relative 24 %      Monocytes Relative 6 %      Eosinophils Relative 2 %      Basophils Relative 1 %      Neutrophils Absolute 10 08 Thousands/µL      Immature Grans Absolute 0 06 Thousand/uL      Lymphocytes Absolute 3 70 Thousands/µL      Monocytes Absolute 0 91 Thousand/µL      Eosinophils Absolute 0 36 Thousand/µL      Basophils Absolute 0 07 Thousands/µL                  CTA ed chest pe study    (Results Pending)              Procedures  ECG 12 Lead Documentation Only    Date/Time: 8/27/2020 9:40 PM  Performed by: Miguel Pepe MD  Authorized by: Miguel Pepe MD     ECG reviewed by me, the ED Provider: yes    Patient location:  ED  Previous ECG:     Previous ECG:  Unavailable  Interpretation:     Interpretation: abnormal    Rate:     ECG rate:  107    ECG rate assessment: tachycardic    Rhythm:     Rhythm: sinus tachycardia    Ectopy:     Ectopy: none    QRS:     QRS axis:  Normal    QRS intervals:  Normal  Conduction:     Conduction: normal    ST segments:     ST segments:  Normal  T waves:     T waves: normal               ED Course  ED Course as of Aug 27 2258   Thu Aug 27, 2020   2258 Signed out to Dr Titi Jeter at 11:00 p m  Pending CTA chest results, repeat troponin at midnight  US AUDIT      Most Recent Value   Initial Alcohol Screen: US AUDIT-C    1  How often do you have a drink containing alcohol?  0 Filed at: 08/27/2020 2136   2  How many drinks containing alcohol do you have on a typical day you are drinking? 0 Filed at: 08/27/2020 2136   3b  FEMALE Any Age, or MALE 65+: How often do you have 4 or more drinks on one occassion? 0 Filed at: 08/27/2020 2136   Audit-C Score  0 Filed at: 08/27/2020 2136            HEART Risk Score      Most Recent Value   Heart Score Risk Calculator   History  0 Filed at: 08/27/2020 2248   ECG  0 Filed at: 08/27/2020 2248   Age  0 Filed at: 08/27/2020 2248   Risk Factors  1 Filed at: 08/27/2020 2248   Troponin  0 Filed at: 08/27/2020 2248   HEART Score  1 Filed at: 08/27/2020 2248            JEFFY/DAST-10      Most Recent Value   How many times in the past year have you    Used an illegal drug or used a prescription medication for non-medical reasons? Never Filed at: 08/27/2020 2136                                MDM      Disposition  Final diagnoses:   None     ED Disposition     None      Follow-up Information    None         Patient's Medications   Discharge Prescriptions    No medications on file     No discharge procedures on file      PDMP Review     None          ED Provider  Electronically Signed by           Vero Fabian MD  08/27/20 7291

## 2020-09-04 ENCOUNTER — HOSPITAL ENCOUNTER (OUTPATIENT)
Dept: RADIOLOGY | Facility: CLINIC | Age: 40
Discharge: HOME | End: 2020-09-04
Payer: COMMERCIAL

## 2020-09-04 ENCOUNTER — OFFICE VISIT (OUTPATIENT)
Dept: OBGYN CLINIC | Facility: CLINIC | Age: 40
End: 2020-09-04
Payer: COMMERCIAL

## 2020-09-04 VITALS
WEIGHT: 230 LBS | BODY MASS INDEX: 36.96 KG/M2 | DIASTOLIC BLOOD PRESSURE: 90 MMHG | HEART RATE: 84 BPM | TEMPERATURE: 96.4 F | HEIGHT: 66 IN | SYSTOLIC BLOOD PRESSURE: 140 MMHG

## 2020-09-04 DIAGNOSIS — S92.334A NONDISPLACED FRACTURE OF THIRD METATARSAL BONE, RIGHT FOOT, INITIAL ENCOUNTER FOR CLOSED FRACTURE: Primary | ICD-10-CM

## 2020-09-04 DIAGNOSIS — M25.571 PAIN IN JOINT INVOLVING RIGHT ANKLE AND FOOT: ICD-10-CM

## 2020-09-04 DIAGNOSIS — S99.921A INJURY OF RIGHT FOOT, INITIAL ENCOUNTER: ICD-10-CM

## 2020-09-04 PROCEDURE — 73610 X-RAY EXAM OF ANKLE: CPT

## 2020-09-04 PROCEDURE — 73630 X-RAY EXAM OF FOOT: CPT

## 2020-09-04 PROCEDURE — 99214 OFFICE O/P EST MOD 30 MIN: CPT | Performed by: STUDENT IN AN ORGANIZED HEALTH CARE EDUCATION/TRAINING PROGRAM

## 2020-09-04 RX ORDER — IBUPROFEN 800 MG/1
800 TABLET ORAL EVERY 8 HOURS PRN
COMMUNITY
Start: 2020-08-11

## 2020-09-04 NOTE — PATIENT INSTRUCTIONS
You have been diagnosed with a third metatarsal shaft fracture  For now we will keep you in a splint as I expect this area to swell  Do not put any weight on this foot until re-evaluation  I recommend keeping your foot elevated as often as possible  You can use as needed ibuprofen or acetaminophen for pain  We will be placing this fracture in a cast next week when the swelling has reduced  Sign/Symptoms of Compartment Syndrome  If you have any worsening pain, pain at rest, numbness or tingling, or difficulty moving your fingers then please notify physician immediately or go to ER as this could be a sign of compartment syndrome which is due to increased pressure in the extremity, can lead to death of nerves, muscle, and paralysis, and is a medical emergency

## 2020-09-04 NOTE — PROGRESS NOTES
1  Nondisplaced fracture of third metatarsal bone, right foot, initial encounter for closed fracture  Fracture / Dislocation Treatment   2  Injury of right foot, initial encounter  XR foot 3+ vw right    XR ankle 3+ vw right   3  Pain in joint involving right ankle and foot  XR foot 3+ vw right    XR ankle 3+ vw right     Orders Placed This Encounter   Procedures    Fracture / Dislocation Treatment    XR foot 3+ vw right    XR ankle 3+ vw right        Imaging Studies (I personally reviewed images in PACS and report):  (Addended): 1  X-ray right foot 09/04/2020: proximal metatarsal shaft fracture  2  X-ray right ankle 09/04/2020: no acute osseous abnormality    IMPRESSION:  Nondisplaced fracture of the proximal 3rd metatarsal  Right foot injury secondary to direct impact over dorsum of midfoot  Date of Injury:  09/04/2020   Follow up interval: Approximately 2 hours ago per patient      PLAN:  -As per patient instructions  -Offered crutches, but patient had a pair of her own with her   -Plan for placing in cast next week once swelling improves and will continue NWB of the right foot for about 3-4 weeks before transitioning out of a cast into firm-soled shoe  Return in about 6 days (around 9/10/2020)  Patient Instructions   You have been diagnosed with a third metatarsal shaft fracture  For now we will keep you in a splint as I expect this area to swell  Do not put any weight on this foot until re-evaluation  I recommend keeping your foot elevated as often as possible  You can use as needed ibuprofen or acetaminophen for pain  We will be placing this fracture in a cast next week when the swelling has reduced       Sign/Symptoms of Compartment Syndrome  If you have any worsening pain, pain at rest, numbness or tingling, or difficulty moving your fingers then please notify physician immediately or go to ER as this could be a sign of compartment syndrome which is due to increased pressure in the extremity, can lead to death of nerves, muscle, and paralysis, and is a medical emergency  CHIEF COMPLAINT:  Right foot pain    HPI:  Geetha Madison is a 44 y o  female  who presents with her mother for       Visit 9/5/2020 :  Initial evaluation of right foot pain after injury two hours ago:    Patient reports she was on a porch swing but it fell and had a direct impact over the top of her right foot  After the injury, her foot started to swell and she was unable to weightbear on her right foot  Pain located over dorsum of midfoot  Pain aggravated while moving toes and ankle  Denies bruising or open wounds  Denies numbness/tingling or color change of her foot  She took ibuprofen 800mg without relief  She reports only a history of right ankle sprain in the past        Review of Systems   Constitutional: Negative for chills, fatigue, fever and unexpected weight change  HENT: Negative for sore throat  Eyes: Negative for pain, redness and visual disturbance  Respiratory: Negative for cough, shortness of breath and wheezing  Cardiovascular: Negative for chest pain, palpitations and leg swelling  Gastrointestinal: Negative for abdominal pain, nausea and vomiting  Endocrine: Negative for polydipsia and polyuria  Genitourinary: Negative for difficulty urinating and hematuria  Musculoskeletal:        As per HPI   Skin: Negative for rash and wound     Neurological:        As per HPI         Medical, Surgical, Family, and Social History    Past Medical History:   Diagnosis Date    Hypertension     Migraines      Past Surgical History:   Procedure Laterality Date    TUBAL LIGATION       Social History   Social History     Substance and Sexual Activity   Alcohol Use Not Currently    Frequency: Monthly or less    Drinks per session: 1 or 2    Binge frequency: Never     Social History     Substance and Sexual Activity   Drug Use Never     Social History     Tobacco Use   Smoking Status Never Smoker Smokeless Tobacco Never Used     Family History   Problem Relation Age of Onset    Heart disease Mother     Hypertension Mother     Hyperlipidemia Mother     Hodgkin's lymphoma Father      No Known Allergies       Physical Exam  /90 (BP Location: Left arm, Patient Position: Sitting, Cuff Size: Adult)   Pulse 84   Temp (!) 96 4 °F (35 8 °C) (Temporal)   Ht 5' 6" (1 676 m)   Wt 104 kg (230 lb)   BMI 37 12 kg/m²     Constitutional:  see vital signs  Gen: well-developed, normocephalic/atraumatic, well-groomed  Eyes: No inflammation or discharge of conjunctiva or lids; sclera clear   Pharynx: no inflammation, lesion, or mass of lips  Neck: supple, no masses, non-distended  MSK: no inflammation, lesion, mass, or clubbing of nails and digits except for other than mentioned below  SKIN: no visible rashes or skin lesions  Pulmonary/Chest: Effort normal  No respiratory distress  NEURO: cranial nerves grossly intact  PSYCH:  Alert and oriented to person, place, and time; recent and remote memory intact; mood normal, no depression, anxiety, or agitation, judgment and insight good and intact     Ortho Exam   Patient seen using the wheelchair from our clinic  Right Calf exam:  Inspection: superficial abrasion of skin over the achilles  No swelling erythema or increased warmth  No palpable cords  Tenderness: none    Right foot exam  +general midfoot swelling   No erythema, ecchymosis or increased warmth  Tenderness: +3rd metatarsal shaft  ROM Toes extension: intact (aggravates foot pain)  ROM Toes flexion: intact (aggravates foot pain)  Strength Toes: 5/5 flex, ext  Sensation intact  Capillary refill intact  Metatarsal squeeze: +     Ankle Examination (focused):      RIGHT   Inspection Erythema none    Ecchymosis none        ROM:  Plantarflexion 40 (aggravates pain)    Dorsiflexion 10 (aggravates pain)        Strength Pronation Deferred due to pain    Supination Deferred due to pain        TTP AiTFL no ATFL no    CFL no    PTFL no    Achilles no    Deltoid no    Peroneal no    Tib Ant no    Tib Post no        TTP (Bony) Prox Fibula no    Lat Malleolus no    Base of 5th MT no    Med Malleolus no    Navicular no    Talar Dome no        Anterior Drawer ATFL negative   Calcaneal Squeeze  negative   Tib-Fib Squeeze Test  negative   Talar Tilt (stab tib,DF foot,invert foot) CFL negative   MT Compression  positive       No calf tenderness to palpation bilaterally    LE NV Exam: +2 DP/PT pulses bilaterally  Sensation intact to light touch L2-S1 bilaterally        Cast application    Date/Time: 9/4/2020 3:05 PM  Performed by: Eden Treviño MD  Authorized by: Eden Treviño MD     Consent:     Consent obtained:  Verbal    Consent given by:  Patient    Risks discussed:  Discoloration, numbness, pain and swelling    Alternatives discussed: discussed casting now, but swelling may worsen and comprimise the neurovascularity of her right foot  Pre-procedure details:     Sensation:  Normal  Procedure details:     Laterality:  Right    Location:  Foot    Foot:  R foot    Strapping: no      Splint type: Hayden Compression Dressing (short leg)    Supplies:  Cotton padding and elastic bandage  Post-procedure details:     Pain:  Unchanged    Sensation:  Normal    Skin color:  Normal pigmentation, without paleness/discoloration/ecchymosis    Patient tolerance of procedure: Tolerated well, no immediate complications  Comments:      Offered crutches once in the splint, but patient reported and had her own pair of crutches with her

## 2020-09-09 NOTE — PROGRESS NOTES
1  Closed nondisplaced fracture of third metatarsal bone of right foot with routine healing, subsequent encounter  XR foot 3+ vw right    meloxicam (MOBIC) 7 5 mg tablet   2  Patellofemoral disorder of left knee  Ambulatory referral to Physical Therapy    meloxicam (MOBIC) 7 5 mg tablet   3  Acute pain of left knee       Orders Placed This Encounter   Procedures    XR foot 3+ vw right    Ambulatory referral to Physical Therapy        Imaging Studies (I personally reviewed images in PACS and report): 1  X-ray right foot 09/10/2020:  Continued demonstration oblique 3rd metatarsal proximal metadiaphyseal fracture without significant displacement compared to prior image  Prior imagin  X-ray right foot 2020: Oblique fracture of the 3rd metatarsal proximal metadiaphyseal region with slight displacement  Proximal extent of the fracture not entirely well visualized due to overlapping osseous structures  No dislocation  2  X-ray right ankle 2020: No acute osseous abnormality  3  X-ray left knee 2020: No acute osseous abnormality    IMPRESSION:  1  Minimally displaced fracture of proximal 3rd metatarsal - Reported injury this morning patient accidentally kicked with left foot (radiographs show no significant displacement compared to prior imaging)  2  Left patellofemoral knee pain (reported history of ACL tear without repair of left knee)    Date of Injury:  2020 (direct impact on dorsum of foot), 09/10/2020 (kicked anterolateral side of foot in splint  Follow up interval:  6 days    PLAN:  Repeat X-ray next visit:   None    Clinical and radiographic findings discussed with patient and her significant other:  1  In regards to her right proximal third metatarsal fracture, repeat imaging today does not show significant displacement compared to prior imaging despite her injury this morning   I transitioned her to a short leg walking CAM boot today, with use of crutches or wheelchair as needed and instructed WBAT for right foot  Prescribed meloxicam 7 5mg PO QD PRN pain and counseled using 500-1000mg of acetaminophen Q8H PRN for further pain relief if needed - instructed to not take other NSAIDs with meloxicam  Counseled/demonstrated ankle ROM exercises to prevent stiffness  Counseled about red flag signs of compartment syndrome as a medical emergency to go to the ER  Follow up in 2 weeks  2  In regards to left knee pain - consistent with patellofemoral pain/arthritis  Her risk factors include a history of left ACL rupture without repair, obesity, and her weight shifting to her left lower extremity since she was non-weightbearing on right lower extremity initially  I have discussed with the patient the pathophysiology of this diagnosis and reviewed how the examination correlates with this diagnosis  Treatment options were discussed at length and after discussing these treatment options, the patient declined left knee joint cortisone/NSAID injection; she preferred a prescription for referral to physical therapy  If she does not improve with therapy or potential injection after 6 weeks, I will consider advanced imaging with MRI  Return in about 2 weeks (around 9/24/2020)  CHIEF COMPLAINT:  Follow up right foot fracture    HPI:  Brian Mc is a 44 y o  female  who presents with her significant other for       Visit 9/10/2020 :  1  Right proximal third metatarsal fracture follow up: Patient reports today that she had an injury this morning in the bathroom where she almost fell but was caught by her significant other  She recalls kicking on a frontal-lateral edge of her right foot (splinted) and having aggravated pain in the foot  Currently, she continues to have pain within mid-foot without radiation  Described as a sharp pain of moderate intensity  Continues to be swollen  No bruising or skin color change  Denies numbness/tingling    She has some relief from naproxen/ibuprofen (taking every 6 hours), elevating foot, and icing  Denies ankle pain but has some stiffness when moving her ankle  2  New complaint - Initial evaluation of left knee pain: chronic issue for years since reported history of ACL tear (not repaired) that has acutely worsened over the past week since she was NWB on her right foot  Pain located around top of patella without radiation  Described as intermittent, sharp/aching, aggravated when trying to push off left foot from sitting to standing (with crutches)  Feels mildly swollen  Denies numbness/tingling, joint erythema, fevers/chills  Other ROS as per below        Visit 09/04/2020:  Radiographs ordered of right foot/ankle as reported above after direct impact injury to dorsum of midfoot  Patient placed in [place] Compression Dressing Splint (short leg) of the right foot to facilitate for swelling and planning for casting or walking boot based on symptoms from 09/10/2020 visit  Review of Systems   Constitutional: Negative for chills and fever  HENT: Negative for sore throat  Respiratory: Negative for cough and shortness of breath  Musculoskeletal:        As per HPI   Skin: Negative for rash     Neurological:        As per HPI         Medical, Surgical, Family, and Social History    Past Medical History:   Diagnosis Date    Hypertension     Migraines      Past Surgical History:   Procedure Laterality Date    TUBAL LIGATION       Social History   Social History     Substance and Sexual Activity   Alcohol Use Not Currently    Frequency: Monthly or less    Drinks per session: 1 or 2    Binge frequency: Never     Social History     Substance and Sexual Activity   Drug Use Never     Social History     Tobacco Use   Smoking Status Never Smoker   Smokeless Tobacco Never Used     Family History   Problem Relation Age of Onset    Heart disease Mother     Hypertension Mother     Hyperlipidemia Mother     Hodgkin's lymphoma Father      No Known Allergies       Physical Exam  Pulse 72   Temp 97 8 °F (36 6 °C)   Resp 18   Ht 5' 6" (1 676 m)   Wt 107 kg (235 lb)   BMI 37 93 kg/m²     Constitutional:  see vital signs  Gen: obese, normocephalic/atraumatic, well-groomed  Eyes: No inflammation or discharge of conjunctiva or lids; sclera clear   Pharynx: no inflammation, lesion, or mass of lips  Neck: supple, no masses, non-distended  MSK: no inflammation, lesion, mass, or clubbing of nails and digits except for other than mentioned below  SKIN: no visible rashes or skin lesions  Pulmonary/Chest: Effort normal  No respiratory distress  NEURO: cranial nerves grossly intact  PSYCH:  Alert and oriented to person, place, and time; recent and remote memory intact; mood normal, no depression, anxiety, or agitation, judgment and insight good and intact     Ortho Exam  Right foot exam (after removal of splint)  No erythema or increased warmth  2+edema, non-pitting  Tenderness: +third metatarsal shaft  ROM Toes extension: intact  ROM Toes flexion: intact  Strength Toes: 5/5 flex, ext  Sensation intact  Capillary refill <2 secs     Ankle Examination (focused):        RIGHT   Inspection Erythema none    Edema none    Ecchymosis none        ROM:  Plantarflexion 30    Dorsiflexion 10        Strength Pronation Deferred due to pain    Supination Deferred due to pain        TTP AiTFL no    ATFL no    CFL no    PTFL no    Achilles no    Deltoid no    Peroneal no    Tib Ant no    Tib Post no        TTP (Bony) Prox Fibula no    Lat Malleolus no    Base of 5th MT no    Med Malleolus no    Navicular no    Talar Dome no        Anterior Drawer ATFL negative   Calcaneal Squeeze  negative   Tib-Fib Squeeze Test  negative       No calf tenderness to palpation bilaterally    LE NV Exam: +2 PT pulses bilaterally    Right Lisfranc Assessment:  Plantar Ecchymosis:  Negative  Pronation Abduction test:  Negative  (forefoot abducted, pronate foot, hindfoot kept in place)  Piano Hernandez Test: Negative  (hindfoot stabilized, passive dorsiflexion & plantar flexion at TMT joint)

## 2020-09-10 ENCOUNTER — OFFICE VISIT (OUTPATIENT)
Dept: OBGYN CLINIC | Facility: CLINIC | Age: 40
End: 2020-09-10
Payer: COMMERCIAL

## 2020-09-10 ENCOUNTER — HOSPITAL ENCOUNTER (OUTPATIENT)
Dept: RADIOLOGY | Facility: CLINIC | Age: 40
Discharge: HOME | End: 2020-09-10
Payer: COMMERCIAL

## 2020-09-10 VITALS
TEMPERATURE: 97.8 F | BODY MASS INDEX: 37.77 KG/M2 | HEART RATE: 72 BPM | RESPIRATION RATE: 18 BRPM | HEIGHT: 66 IN | WEIGHT: 235 LBS

## 2020-09-10 DIAGNOSIS — S92.334D CLOSED NONDISPLACED FRACTURE OF THIRD METATARSAL BONE OF RIGHT FOOT WITH ROUTINE HEALING, SUBSEQUENT ENCOUNTER: Primary | ICD-10-CM

## 2020-09-10 DIAGNOSIS — S92.334D CLOSED NONDISPLACED FRACTURE OF THIRD METATARSAL BONE OF RIGHT FOOT WITH ROUTINE HEALING, SUBSEQUENT ENCOUNTER: ICD-10-CM

## 2020-09-10 DIAGNOSIS — M25.562 ACUTE PAIN OF LEFT KNEE: ICD-10-CM

## 2020-09-10 DIAGNOSIS — M22.2X2 PATELLOFEMORAL DISORDER OF LEFT KNEE: ICD-10-CM

## 2020-09-10 PROBLEM — S83.512A RUPTURE OF ANTERIOR CRUCIATE LIGAMENT OF LEFT KNEE: Status: ACTIVE | Noted: 2020-09-10

## 2020-09-10 PROCEDURE — 99213 OFFICE O/P EST LOW 20 MIN: CPT | Performed by: STUDENT IN AN ORGANIZED HEALTH CARE EDUCATION/TRAINING PROGRAM

## 2020-09-10 PROCEDURE — 73630 X-RAY EXAM OF FOOT: CPT

## 2020-09-10 RX ORDER — MELOXICAM 7.5 MG/1
7.5 TABLET ORAL DAILY
Qty: 60 TABLET | Refills: 0 | Status: SHIPPED | OUTPATIENT
Start: 2020-09-10

## 2020-09-10 NOTE — PATIENT INSTRUCTIONS
You have been diagnosed with a minimally displaced fracture of your third metatarsal   These fractures take approximately 6-8 weeks to heal   There is the possibility of persistent pain for several weeks or months after healing however  We have transitioned you today from a splint to a walking boot which he can ambulate on as tolerated  Use crutches as needed if unable to bear full weight in the boot  If he feel that you having difficulty walking in the boot, we can can switch to from the boot to a walking cast   I recommend doing ankle range of motion exercises as demonstrated in office today to prevent stiffness  Other ways to reduce the pain is to elevate her foot and ice as needed 20 minutes on/off  It is okay to take as needed over-the-counter acetaminophen or NSAIDs for pain

## 2020-09-15 ENCOUNTER — EVALUATION (OUTPATIENT)
Dept: PHYSICAL THERAPY | Facility: CLINIC | Age: 40
End: 2020-09-15
Payer: COMMERCIAL

## 2020-09-15 DIAGNOSIS — M22.2X2 PATELLOFEMORAL DISORDER OF LEFT KNEE: ICD-10-CM

## 2020-09-15 PROCEDURE — 97535 SELF CARE MNGMENT TRAINING: CPT | Performed by: PHYSICAL THERAPIST

## 2020-09-15 PROCEDURE — 97162 PT EVAL MOD COMPLEX 30 MIN: CPT | Performed by: PHYSICAL THERAPIST

## 2020-09-15 NOTE — PROGRESS NOTES
PT Evaluation   Today's date: 9/15/2020  Patient name: Ashley Velasco  : 1980  MRN: [id number]  Referring provider: Lore Rivera MD  Dx:   Encounter Diagnosis     ICD-10-CM    1  Patellofemoral disorder of left knee  M22 2X2 Ambulatory referral to Physical Therapy     Assessment  Assessment details: Patient has come here for rehab on her left knee region  Patient was using B Crutches to assist with ambulation  Patient was also wearing a right foot brace / boot to help with her right foot 3rd metatarsal fracture  Impairments: abnormal gait, abnormal or restricted ROM, abnormal movement, activity intolerance, impaired balance, impaired physical strength, lacks appropriate home exercise program, pain with function, safety issue, weight-bearing intolerance and poor body mechanics  Understanding of Dx/Px/POC: excellent  Goals  STG 2-4 weeks:    Increase B LE strength 2-5 lbs  Decrease pain by 1-2 levels on 1-10 pain scale  Increase standing/walking tolerance to >30 minutes  Patient independent with HEP  LTG 6-8 weeks:   Increase B LE strength 10-20 lbs  Decrease pain to 0-2/10 with activity  Increase single leg stance >30 seconds  Increase standing/walking tolerance to >90 minutes  Increase range of motion to normal   Improve gait pattern, coordination and balance to normal   D/C with HEP  Plan  Plan details: All planned modality interventions and planned therapy interventions are provided PRN    Patient would benefit from: PT eval and skilled physical therapy  Planned modality interventions: unattended electrical stimulation and ultrasound  Planned therapy interventions: joint mobilization, manual therapy, balance, balance/weight bearing training, neuromuscular re-education, patient education, postural training, self care, strengthening, stretching, therapeutic activities, therapeutic exercise, therapeutic training, transfer training, home exercise program, graded exercise, gait training, flexibility and coordination  Frequency: 3x week  Duration in weeks: 12  Treatment plan discussed with: patient      Subjective Evaluation    Pain  Quality: discomfort, knife-like, pulling, squeezing, sharp, tight, throbbing and radiating  Relieving factors: relaxation, rest, support, change in position and ice  Aggravating factors: lifting, running, stair climbing, walking and standing  Progression: worsening    Treatments  Current treatment: physical therapy  Patient Goals  Patient goals for therapy: decreased pain, improved balance, increased motion, return to work, return to [employer] activities, independence with ADLs/IADLs and increased strength      Date of onset:  7/14/2020    Date of Surgery:  None    History of Present Episode: 9/15/2020  Bubba Milligan states her left knee flared up about two months ago  She suspects she originally injured her left knee in March when she slide / fell down the stairs  She has been getting worse with her knee since March  Past Medical History:    9/15/2020  Bubba Milligan reports fractured left ankle  Fractured left fibula  Right third metatarsal fracture  Previous Level of Functional Ability:  9/15/2020  Bubba Milligan states she had issues or limitations before she fell and her left knee flared up  Inspection / Palpation:  Knee:  9/15/2020  Mesomorphic / Endomorphic body type  No signs of infection  No signs of wounds  No signs of drainage  No signs of ecchymotic regions  No signs of erythremic regions  Moderate signs of muscle spasm  Moderate signs of muscle guarding  Mild to moderate signs of tenderness reported to palpation  Mild to moderate signs of swelling  No signs of a surgery site  Current conditions appears consistent with recent acute episode  Chief Complaints:  9/15/2020  Bubba Milligan reports moderate difficulty with standing  Bubba Milligan reports moderate difficulty with walking    Bubba Milligan reports mild to moderate difficulty with movement / use of her left knee  Arvind Roy reports moderate difficulty with use of stairs  Arvind Roy reports severe difficulty with running  Arvind Roy reports severe difficulty with jumping  Arvind Roy reports moderate difficulty with use of inclines  Arvind Roy reports no difficulty with sleeping  Arvind Roy reports mild to moderate difficulty with her strength and endurance  Arvind Roy reports mild to moderate limitations with her range of motion  Arvind Roy reports no difficulty lying on her left knee region  KNEE PAIN Resting Moving Palpation Standing Walking   9/15/2020 Rt 0 0 0 0 0   9/15/2020 Lt 0-2 2-4 2-4 2-4 2-4     KNEE PAIN Running Stairs Sleeping Twisting Jumping   9/15/2020 Rt 0 0 0 0 0   9/15/2020 Lt NA 4-6 0-2 2-4 NA     KNEE AROM Flexion Extension SLR   9/15/2020 Rt 145° 0° 95°   9/15/2020 Lt 129° 4° 82°     KNEE MMT / PAIN Flexion Extension Varus Stress Valgus Stress   9/15/2020 Rt 0/10  37 lbs 0/10  35 lbs 0/10  25 lbs 0/10  26 lbs   9/15/2020 Lt 0/10  24 lbs 5/10  15 lbs 0/10  13 lbs 0/10  14 lbs     Knee Screen MCL LCL ACL PCL   9/15/2020 Rt Negative Negative Negative Negative   9/15/2020 Lt Negative Negative POSITIVE Negative     Knee Screen Patellar Quad Stress Meniscal Medial Meniscal Lateral   9/15/2020 Rt Negative Negative Negative Negative   9/15/2020 Lt POSITIVE Negative Negative Negative     Precautions:  Left Knee Issues  All treatments below will be provided with a focus on strengthening, flexibility, ROM, postural,   endurance and any possible swelling and pain which may be present without ignoring   neural issues involving balance, coordination and proprioception which is also important   and necessary to provide full functional mobility and quality care        Daily Treatment Log  Manual  9/15       MT, ROM        HEP 15'       Exercise Log 9/15       Balance Board        Chair Squats        P-Bar-GT-Forward, Backward,Side-Even & Dips        BOSU-Walk        Foam Pad SLR,Hip/KneeFl,Step Ups        Foam Beam Fitter-Luanam        Monster Steps        BAPS- L-2        T/G-Squats PF        W/P-Hip-Abd,Add,Flex,Ext        WP-Squats        NuStep        [id number]        NK Table Exer        NK Table ROM        TM        Stepper        Bike        ME, PE 15'               Modalities 9/15       Espino[address] / Gordon Montero / LILIANE        US

## 2020-09-15 NOTE — LETTER
September 15, 2020  PT Evaluation Plan of [address] [facility] Abhijit Mera MD  [address] [facility] [address]    Patient: Deepa Mention   YOB: 1980   Date of Visit: 9/15/2020     Encounter Diagnosis     ICD-10-CM    1  Patellofemoral disorder of left knee  M22 2X2 Ambulatory referral to Physical Therapy     Dear Dr Dheeraj Silvestre: Thank you for your recent referral of Deepa Mccray  Please review the attached evaluation summary from Christina's recent visit  Please verify that you agree with the plan of care by signing the attached order  If you have any questions or concerns, please do not hesitate to call  I sincerely appreciate the opportunity to share in the care of one of your patients and hope to have another opportunity to work with you in the near future  Sincerely,    Yanira Doyle, PT    Referring Provider:      I certify that I have read the below Plan of Care and certify the need for these services furnished under this plan of treatment while under my care  Crystal Amado MD  [address] [facility]    Please SIGN ABOVE and return THIS PAGE ONLY to Fax # [phone]          PT Evaluation   Today's date: 9/15/2020  Patient name: Deepa Mention  : 1980  MRN: [id number]  Referring provider: Bj De La Cruz MD  Dx:   Encounter Diagnosis     ICD-10-CM    1  Patellofemoral disorder of left knee  M22 2X2 Ambulatory referral to Physical Therapy     Assessment  Assessment details: Patient has come here for rehab on her left knee region  Patient was using B Crutches to assist with ambulation  Patient was also wearing a right foot brace / boot to help with her right foot 3rd metatarsal fracture      Impairments: abnormal gait, abnormal or restricted ROM, abnormal movement, activity intolerance, impaired balance, impaired physical strength, lacks appropriate home exercise program, pain with function, safety issue, weight-bearing intolerance and poor body mechanics  Understanding of Dx/Px/POC: excellent  Goals  STG 2-4 weeks:    Increase B LE strength 2-5 lbs  Decrease pain by 1-2 levels on 1-10 pain scale  Increase standing/walking tolerance to >30 minutes  Patient independent with HEP  LTG 6-8 weeks:   Increase B LE strength 10-20 lbs  Decrease pain to 0-2/10 with activity  Increase single leg stance >30 seconds  Increase standing/walking tolerance to >90 minutes  Increase range of motion to normal   Improve gait pattern, coordination and balance to normal   D/C with HEP  Plan  Plan details: All planned modality interventions and planned therapy interventions are provided PRN    Patient would benefit from: PT eval and skilled physical therapy  Planned modality interventions: unattended electrical stimulation and ultrasound  Planned therapy interventions: joint mobilization, manual therapy, balance, balance/weight bearing training, neuromuscular re-education, patient education, postural training, self care, strengthening, stretching, therapeutic activities, therapeutic exercise, therapeutic training, transfer training, home exercise program, graded exercise, gait training, flexibility and coordination  Frequency: 3x week  Duration in weeks: 12  Treatment plan discussed with: patient      Subjective Evaluation    Pain  Quality: discomfort, knife-like, pulling, squeezing, sharp, tight, throbbing and radiating  Relieving factors: relaxation, rest, support, change in position and ice  Aggravating factors: lifting, running, stair climbing, walking and standing  Progression: worsening    Treatments  Current treatment: physical therapy  Patient Goals  Patient goals for therapy: decreased pain, improved balance, increased motion, return to work, return to [employer] activities, independence with ADLs/IADLs and increased strength      Date of onset:  7/14/2020    Date of Surgery:  None    History of Present Episode: 9/15/2020  Hesham Saenz states her left knee flared up about two months ago  She suspects she originally injured her left knee in March when she slide / fell down the stairs  She has been getting worse with her knee since March  Past Medical History:    9/15/2020  Martita Levin reports fractured left ankle  Fractured left fibula  Right third metatarsal fracture  Previous Level of Functional Ability:  9/15/2020  Martita Levin states she had issues or limitations before she fell and her left knee flared up  Inspection / Palpation:  Knee:  9/15/2020  Mesomorphic / Endomorphic body type  No signs of infection  No signs of wounds  No signs of drainage  No signs of ecchymotic regions  No signs of erythremic regions  Moderate signs of muscle spasm  Moderate signs of muscle guarding  Mild to moderate signs of tenderness reported to palpation  Mild to moderate signs of swelling  No signs of a surgery site  Current conditions appears consistent with recent acute episode  Chief Complaints:  9/15/2020  Martita Levin reports moderate difficulty with standing  Martita Levin reports moderate difficulty with walking  Martita Levin reports mild to moderate difficulty with movement / use of her left knee  Martita Levin reports moderate difficulty with use of stairs  Martita Levin reports severe difficulty with running  Martita Levin reports severe difficulty with jumping  Martita Levin reports moderate difficulty with use of inclines  Martita Levin reports no difficulty with sleeping  Martita Levin reports mild to moderate difficulty with her strength and endurance  Martita Levin reports mild to moderate limitations with her range of motion  Martita Levin reports no difficulty lying on her left knee region      KNEE PAIN Resting Moving Palpation Standing Walking   9/15/2020 Rt 0 0 0 0 0   9/15/2020 Lt 0-2 2-4 2-4 2-4 2-4     KNEE PAIN Running Stairs Sleeping Twisting Jumping   9/15/2020 Rt 0 0 0 0 0   9/15/2020 Lt NA 4-6 0-2 2-4 NA     KNEE AROM Flexion Extension SLR   9/15/2020 Rt 145° 0° 95°   9/15/2020 Lt 129° 4° 82° KNEE MMT / PAIN Flexion Extension Varus Stress Valgus Stress   9/15/2020 Rt 0/10  37 lbs 0/10  35 lbs 0/10  25 lbs 0/10  26 lbs   9/15/2020 Lt 0/10  24 lbs 5/10  15 lbs 0/10  13 lbs 0/10  14 lbs     Knee Screen MCL LCL ACL PCL   9/15/2020 Rt Negative Negative Negative Negative   9/15/2020 Lt Negative Negative POSITIVE Negative     Knee Screen Patellar Quad Stress Meniscal Medial Meniscal Lateral   9/15/2020 Rt Negative Negative Negative Negative   9/15/2020 Lt POSITIVE Negative Negative Negative     Precautions:  Left Knee Issues  All treatments below will be provided with a focus on strengthening, flexibility, ROM, postural,   endurance and any possible swelling and pain which may be present without ignoring   neural issues involving balance, coordination and proprioception which is also important   and necessary to provide full functional mobility and quality care        Daily Treatment Log  Manual  9/15       MT, ROM        HEP 15'       Exercise Log 9/15       Balance Board        Chair Squats        P-Bar-GT-Forward, Backward,Side-Even & Dips        BOSU-Walk        Foam Pad SLR,Hip/KneeFl,Step Ups        Foam Beam        Fitter-Slalom        Monster Steps        BAPS- L-2        T/G-Squats PF        W/P-Hip-Abd,Add,Flex,Ext        WP-Squats        NuStep        [id number]-HL-Vb        NK Table Exer        NK Table ROM        TM        Stepper        Bike        ME, PE 15'               Modalities 9/15       Esp[address] / Kimberlyn Henderson / LILIANE        US

## 2020-09-16 ENCOUNTER — OFFICE VISIT (OUTPATIENT)
Dept: PHYSICAL THERAPY | Facility: CLINIC | Age: 40
End: 2020-09-16
Payer: COMMERCIAL

## 2020-09-16 DIAGNOSIS — M22.2X2 PATELLOFEMORAL DISORDER OF LEFT KNEE: Primary | ICD-10-CM

## 2020-09-16 PROCEDURE — 97112 NEUROMUSCULAR REEDUCATION: CPT | Performed by: PHYSICAL THERAPIST

## 2020-09-16 PROCEDURE — 97140 MANUAL THERAPY 1/> REGIONS: CPT | Performed by: PHYSICAL THERAPIST

## 2020-09-16 PROCEDURE — 97110 THERAPEUTIC EXERCISES: CPT | Performed by: PHYSICAL THERAPIST

## 2020-09-16 NOTE — PROGRESS NOTES
Today's date: 2020  Patient name: Samira Crowell  : 1980  MRN: [id number]  Referring provider: Lois Wood MD  Dx:   Encounter Diagnosis     ICD-10-CM    1  Patellofemoral disorder of left knee  M22 2X2      Subjective:  Aldo states her left knee is feeling sore today  Objective: See treatment log below  Aldo continues to be advised to follow her home exercise program as tolerated  When Aldo is feeling good she follows her rehab exercises in the gym and on other days she follows her home exercise program at home as tolerated  In the future we plan on having Christina stay at home and follow her home exercise program for four to six weeks and than assess for either more Rehab treatments or discharge from Rehab care  Assessment: Tolerated treatment well  Patient exhibited good technique with therapeutic exercises and would benefit from continued PT  Christina's goals are to continue to improve with her rehab program and improve with functional mobility, speed, repetition and decreased c/o pain with her gym and home exercise program   Christina's final goal for her rehab is to be discharged from Rehab care after obtaining her full functional rehab potential     Plan: Continue per plan of care  Progress treatment as tolerated  Precautions:  Left Knee Issues / Patella Sensitivity    All treatments below will be provided with a focus on strengthening, flexibility, ROM, postural,   endurance and any possible swelling and pain which may be present without ignoring   neural issues involving balance, coordination and proprioception which is also important   and necessary to provide full functional mobility and quality care        Daily Treatment Log  Manual  9/15 9/16      MT, ROM  25'      HEP 15'       Exercise Log 9/15 9/19      Balance Board        Chair Squats        P-Bar-GT-Forward, Backward,Side-Even & Dips        BOSU-Walk        Foam Pad SLR,Hip/KneeFl,Step Ups        Foam Beam Fitter-Slajorge luism        Monster Steps        BAPS- L-2        T/G-Squats PF  30x      W/P-Hip-Abd,Add,Flex,Ext  10#-30x      WP-Squats        NuStep  20' L5      [id number]        NK Table Exer        NK Table ROM        [employer]        ME, PE 15' 15'              Modalities 9/15 9/16      [address] / Reuben Ovalles / LILIANE        US

## 2020-09-21 ENCOUNTER — OFFICE VISIT (OUTPATIENT)
Dept: PHYSICAL THERAPY | Facility: CLINIC | Age: 40
End: 2020-09-21
Payer: COMMERCIAL

## 2020-09-21 DIAGNOSIS — M22.2X2 PATELLOFEMORAL DISORDER OF LEFT KNEE: Primary | ICD-10-CM

## 2020-09-21 PROCEDURE — 97112 NEUROMUSCULAR REEDUCATION: CPT

## 2020-09-21 PROCEDURE — 97140 MANUAL THERAPY 1/> REGIONS: CPT

## 2020-09-21 NOTE — PROGRESS NOTES
Today's date: 2020  Patient name: Brian Mc  : 1980  MRN: [id number]  Referring provider: Christi Villatoro MD  Dx:   Encounter Diagnosis     ICD-10-CM    1  Patellofemoral disorder of left knee  M22 2X2      Subjective:  No new c/o pain today  Objective: See treatment log below  Torsten Fung continues to be advised to follow her home exercise program as tolerated  When Torsten Fung is feeling good she follows her rehab exercises in the gym and on other days she follows her home exercise program at home as tolerated  In the future we plan on having Christina stay at home and follow her home exercise program for four to six weeks and than assess for either more Rehab treatments or discharge from Rehab care  Assessment: Tolerated treatment well  Patient exhibited good technique with therapeutic exercises and would benefit from continued PT to increase L knee ROM/strength and endurance to improve mobility  Christina's goals are to continue to improve with her rehab program and improve with functional mobility, speed, repetition and decreased c/o pain with her gym and home exercise program   Christina's final goal for her rehab is to be discharged from Rehab care after obtaining her full functional rehab potential     Plan: Continue per plan of care  Progress treatment as tolerated  Precautions:  Left Knee Issues / Patella Sensitivity    All treatments below will be provided with a focus on strengthening, flexibility, ROM, postural,   endurance and any possible swelling and pain which may be present without ignoring   neural issues involving balance, coordination and proprioception which is also important   and necessary to provide full functional mobility and quality care        Daily Treatment Log  Manual  9/15 9/16 9/21     MT, ROM  25' 20'     HEP 15'       Exercise Log 9/15 9/19 9/21     Balance Board        Chair Squats        P-Bar-GT-Forward, Backward,Side-Even & Dips        BOSU-Walk        Foam Pad SLR,Hip/KneeFl,Step Ups        Foam Beam        Fitter-Slalom        Monster Steps        BAPS- L-2        T/G-Squats PF  30x NT     W/P-Hip-Abd,Add,Flex,Ext  10#-30x 10# 3x30     WP-Squats        NuStep  20' L5 20' L5     [id number]        NK Table Exer   L 5#  R 10#   30x     NK Table ROM        Keena Schroeder        ME, PE 15' 15' 15'             Modalities 9/15 9/16 9/21     [facility] / Shruti Cabral / [facility]

## 2020-09-21 NOTE — PROGRESS NOTES
1  Closed nondisplaced fracture of third metatarsal bone of right foot with routine healing, subsequent encounter     2  Patellofemoral disorder of left knee       No orders of the defined types were placed in this encounter  Imaging Studies (I personally reviewed images in PACS and report):    Prior imagin  X-ray right foot 09/10/2020: Unchanged alignment of the comminuted intra-articular fracture at the base of the 3rd metatarsal   2  X-ray right foot 2020: Oblique fracture of the 3rd metatarsal proximal metadiaphyseal region with slight displacement   Proximal extent of the fracture not entirely well visualized due to overlapping osseous structures   No dislocation  3  X-ray right ankle 2020: No acute osseous abnormality  4  X-ray left knee 2020: No acute osseous abnormality    IMPRESSION:  Minimally displaced fracture of proximal 3rd metatarsal of the RIGHT foot  Right lateral ankle pain DDx: CFL sprain, ankle stiffness, peroneal strain  Left patellofemoral knee pain     PMH: Reported history of left ACL tear without repair    Date of Injury:  2020  Follow up interval: 2 weeks, 6 days      PLAN:  Repeat X-ray next visit:   Right foot    In regards to right proximal 3rd metatarsal fracture,  -Continue CAM boot for right foot with weight bearing as tolerated - plan for transition to post-op shoe on next visit or hard sole shoe   -Discussed repeating imaging in two weeks to document healing  -Continue ROM exercises of ankle  -Discontinue meloxicam and use as needed OTC acetaminophen/NSAIDs    In regards to left knee patellofemoral knee pain  -Continue physical therapy  -Prescribed voltaren gel as needed; counseled icing on/off 20 minutes as well  -Patient declined cortisone injection  No follow-ups on file  There are no Patient Instructions on file for this visit          CHIEF COMPLAINT:  Follow up right foot fracture, left knee pain    HPI:  Lana Lu is a 44 y o  female  who presents for       Visit 9/24/2020: Follow up evaluation of RIGHT third metatarsal fracture: Patient reports that her pain has improved in her right foot  She has been able to transition from wheelchair/crutches and CAM boot to only using a CAM boot to ambulate  She has not used the meloxicam for about a week  Swelling has improved  Pain described as intermittent, sharp/aching, mild intensity localized to over the fracture and less-so over lateral ankle  Denies numbness/tingling  Additionally feels stiff in her ankle but this has improved with home exercises  Pain improved with rest      Follow up evaluation of LEFT knee pain: She reports that the pain is improving through physical therapy  Pain located over superior patella and lateral knee  Described as a sharp pain, mild intensity, aggravated mostly by the end of the day after ambulating  Pain improved with rest  She is asking today for any other pain relievers that are not injections  No new injuries since last visit    Visit 9/10/2020:  Repeat radiographs of foot as above showed unchanged alignment of fracture  Placed in short leg CAM boot with recommendations to transition from crutches/wheelchair to WBAT  Recommended ROM ankle exercises to prevent stiffness  In addition, left knee pain diagnosed as patellofemoral syndrome/arthritis flare  Prescribed meloxicam 7 5mg PO QD PRN  Offered cortisone injection, but she declined  Referred to formal physical therapy in regards to left knee  Visit 9/4/2020:  Initial visit with radiographs as above; placed in [place]den Compression Dressing Splint (short leg), NWB with crutches/wheelchair      Review of Systems   Constitutional: Negative for chills, fatigue and fever  Respiratory: Negative for cough, shortness of breath and wheezing  Cardiovascular: Negative for chest pain  Gastrointestinal: Negative for abdominal pain, diarrhea, nausea and vomiting     Musculoskeletal:        As per HPI Skin: Negative for rash  Neurological:        As per HPI         Medical, Surgical, Family, and Social History    Past Medical History:   Diagnosis Date    Hypertension     Migraines      Past Surgical History:   Procedure Laterality Date    TUBAL LIGATION       Social History   Social History     Substance and Sexual Activity   Alcohol Use Not Currently    Frequency: Monthly or less    Drinks per session: 1 or 2    Binge frequency: Never     Social History     Substance and Sexual Activity   Drug Use Never     Social History     Tobacco Use   Smoking Status Never Smoker   Smokeless Tobacco Never Used     Family History   Problem Relation Age of Onset    Heart disease Mother     Hypertension Mother     Hyperlipidemia Mother     Hodgkin's lymphoma Father      No Known Allergies       Physical Exam  There were no vitals taken for this visit  Constitutional:  see vital signs  Gen: obese, normocephalic/atraumatic, well-groomed  Eyes: No inflammation or discharge of conjunctiva or lids; sclera clear   Pharynx: no inflammation, lesion, or mass of lips  Neck: supple, no masses, non-distended  MSK: no inflammation, lesion, mass, or clubbing of nails and digits except for other than mentioned below  SKIN: no visible rashes or skin lesions  Pulmonary/Chest: Effort normal  No respiratory distress     NEURO: cranial nerves grossly intact  PSYCH:  Alert and oriented to person, place, and time; recent and remote memory intact; mood normal, no depression, anxiety, or agitation, judgment and insight good and intact     Ortho Exam  Right foot exam  No increased warmth  +mild swelling   Tenderness: +base of third metatarsal  ROM Toes extension: intact  ROM Toes flexion: intact  Strength Toes: 5/5 flex, ext  Sensation intact  Capillary refill <2secs     Ankle Examination (focused):     Gait: ambulates with CAM boot      RIGHT   Inspection Erythema none    Edema none    Ecchymosis none        ROM:  Plantarflexion 40 Dorsiflexion 20        Strength Pronation 5/5    Supination 5/5        TTP AiTFL no    ATFL no    CFL +    PTFL no    Achilles +    Deltoid no    Peroneal no    Tib Ant no    Tib Post no        TTP (Bony) Prox Fibula no    Lat Malleolus no    Base of 5th MT no    Med Malleolus no    Navicular no    Talar Dome no        Anterior Drawer ATFL negative   Calcaneal Squeeze  negative   Tib-Fib Squeeze Test  negative   Talar Tilt (stab tib,DF foot,invert foot) CFL negative   ER Stress (stab tib,ER foot) High ankle negative   Eversion stress (stab tib, lilly foot) deltoid negative       Left Knee Exam:  Erythema: no  Swelling: no  Increased Warmth: no  Tenderness: +superior patella along insertion of quadriceps, +lateral condyle of tibia along IT band  ROM: 0-130  Patellar Apprehension: negative  Patellar Grind: negative  Lachman's: negative  Varus laxity: negative  Valgus laxity: negative  [facility]: negative       Procedures

## 2020-09-23 ENCOUNTER — OFFICE VISIT (OUTPATIENT)
Dept: PHYSICAL THERAPY | Facility: CLINIC | Age: 40
End: 2020-09-23
Payer: COMMERCIAL

## 2020-09-23 DIAGNOSIS — M22.2X2 PATELLOFEMORAL DISORDER OF LEFT KNEE: Primary | ICD-10-CM

## 2020-09-23 PROCEDURE — 97140 MANUAL THERAPY 1/> REGIONS: CPT | Performed by: PHYSICAL THERAPIST

## 2020-09-23 PROCEDURE — 97110 THERAPEUTIC EXERCISES: CPT | Performed by: PHYSICAL THERAPIST

## 2020-09-23 PROCEDURE — 97112 NEUROMUSCULAR REEDUCATION: CPT | Performed by: PHYSICAL THERAPIST

## 2020-09-23 NOTE — PROGRESS NOTES
Today's date: 2020  Patient name: Richar Gaitan  : 1980  MRN: [id number]  Referring provider: Evon Babb MD  Dx:   Encounter Diagnosis     ICD-10-CM    1  Patellofemoral disorder of left knee  M22 2X2      Subjective:  Odalis Bradshaw states her left knee is feeling better  She can stand longer and walk further now  Objective: See treatment log below  Odalis Bradshaw continues to be advised to follow her home exercise program as tolerated  When Odalis Bradshaw is feeling good she follows her rehab exercises in the gym and on other days she follows her home exercise program at home as tolerated  In the future we plan on having Christina stay at home and follow her home exercise program for four to six weeks and than assess for either more Rehab treatments or discharge from Rehab care  Assessment: Tolerated treatment well  Patient exhibited good technique with therapeutic exercises and would benefit from continued PT  Christina's goals are to continue to improve with her rehab program and improve with functional mobility, speed, repetition and decreased c/o pain with her gym and home exercise program   Christina's final goal for her rehab is to be discharged from Rehab care after obtaining her full functional rehab potential     Plan: Continue per plan of care  Progress treatment as tolerated  Precautions:  Left Knee Issues / Patella Sensitivity    All treatments below will be provided with a focus on strengthening, flexibility, ROM, postural,   endurance and any possible swelling and pain which may be present without ignoring   neural issues involving balance, coordination and proprioception which is also important   and necessary to provide full functional mobility and quality care        Daily Treatment Log  Manual  9/15 9/16 9/21 9/23    MT, ROM  25' 20' 25'    HEP 15'       Exercise Log 9/15 9/19 9/21 9/23    Balance Board        Chair Squats        P-Bar-GT-Forward, [address] Foam Pad SLR,Hip/KneeFl,Step Ups        Foam Beam        Fitter-Slalom        Monster Steps        BAPS- L-2        T/G-Squats PF  30x NT 30x    W/P-Hip-Abd,Add,Flex,Ext  10#-30x 10# 3x30 10# 3x30    WP-Squats        NuStep  20' L5 20' L5 20' L5    [id number]        NK Table Exer   L 5#  R 10#   30x L 5#  R 10#  30x    NK Table ROM        Shana Maker        ME, PE 15' 15' 15' 15'            Modalities 9/15 9/16 9/21 9/23    [facility] / Fe Borja / LILIANE   [facility] 20'    US

## 2020-09-24 ENCOUNTER — OFFICE VISIT (OUTPATIENT)
Dept: OBGYN CLINIC | Facility: CLINIC | Age: 40
End: 2020-09-24
Payer: COMMERCIAL

## 2020-09-24 VITALS
HEART RATE: 87 BPM | DIASTOLIC BLOOD PRESSURE: 76 MMHG | BODY MASS INDEX: 37.77 KG/M2 | WEIGHT: 235 LBS | SYSTOLIC BLOOD PRESSURE: 128 MMHG | TEMPERATURE: 97.8 F | HEIGHT: 66 IN

## 2020-09-24 DIAGNOSIS — M22.2X2 PATELLOFEMORAL DISORDER OF LEFT KNEE: ICD-10-CM

## 2020-09-24 DIAGNOSIS — S92.334D CLOSED NONDISPLACED FRACTURE OF THIRD METATARSAL BONE OF RIGHT FOOT WITH ROUTINE HEALING, SUBSEQUENT ENCOUNTER: Primary | ICD-10-CM

## 2020-09-24 PROCEDURE — 99213 OFFICE O/P EST LOW 20 MIN: CPT | Performed by: STUDENT IN AN ORGANIZED HEALTH CARE EDUCATION/TRAINING PROGRAM

## 2020-09-28 ENCOUNTER — OFFICE VISIT (OUTPATIENT)
Dept: PHYSICAL THERAPY | Facility: CLINIC | Age: 40
End: 2020-09-28
Payer: COMMERCIAL

## 2020-09-28 DIAGNOSIS — M22.2X2 PATELLOFEMORAL DISORDER OF LEFT KNEE: Primary | ICD-10-CM

## 2020-09-28 PROCEDURE — 97140 MANUAL THERAPY 1/> REGIONS: CPT | Performed by: PHYSICAL THERAPIST

## 2020-09-28 PROCEDURE — 97112 NEUROMUSCULAR REEDUCATION: CPT | Performed by: PHYSICAL THERAPIST

## 2020-09-28 PROCEDURE — 97110 THERAPEUTIC EXERCISES: CPT | Performed by: PHYSICAL THERAPIST

## 2020-09-28 NOTE — PROGRESS NOTES
Today's date: 2020  Patient name: Mihir No  : 1980  MRN: [id number]  Referring provider: Kyrie Maher MD  Dx:   Encounter Diagnosis     ICD-10-CM    1  Patellofemoral disorder of left knee  M22 2X2      Subjective:  Maty Wheatley states her left knee is still sore but feels a little better  Objective: See treatment log below  Maty Wheatley continues to be advised to follow her home exercise program as tolerated  When Maty Wheatley is feeling good she follows her rehab exercises in the gym and on other days she follows her home exercise program at home as tolerated  In the future we plan on having Christina stay at home and follow her home exercise program for four to six weeks and than assess for either more Rehab treatments or discharge from Rehab care  Assessment: Tolerated treatment well  Patient exhibited good technique with therapeutic exercises and would benefit from continued PT  Christina's goals are to continue to improve with her rehab program and improve with functional mobility, speed, repetition and decreased c/o pain with her gym and home exercise program   Christina's final goal for her rehab is to be discharged from Rehab care after obtaining her full functional rehab potential     Plan: Continue per plan of care  Progress treatment as tolerated  Precautions:  Left Knee Issues / Patella Sensitivity    All treatments below will be provided with a focus on strengthening, flexibility, ROM, postural,   endurance and any possible swelling and pain which may be present without ignoring   neural issues involving balance, coordination and proprioception which is also important   and necessary to provide full functional mobility and quality care        Daily Treatment Log  Manual  9/15 9/16 9/21 9/23    MT, ROM  25' 20' 25'    HEP 15'       Exercise Log 9/15 9/19 9/21 9/23    Balance Board        Chair Squats        P-Bar-GT-Forward, Backward,Side-Even & Dips        BOSU-Walk        Foam Pad SLR,Hip/KneeFl,Step Ups        Foam Beam        Fitter-Slalom        Monster Steps        BAPS- L-2        T/G-Squats PF  30x NT 30x    W/P-Hip-Abd,Add,Flex,Ext  10#-30x 10# 3x30 10# 3x30    WP-Squats        NuStep  20' L5 20' L5 20' L5    [id number]        NK Table Exer   L 5#  R 10#   30x L 5#  R 10#  30x    NK Table ROM        Deysi Robert        ME, PE 15' 15' 15' 15'            Modalities 9/15 9/16 9/21 9/23    [facility] / [place] / [facility] 20'

## 2020-10-01 ENCOUNTER — APPOINTMENT (OUTPATIENT)
Dept: PHYSICAL THERAPY | Facility: CLINIC | Age: 40
End: 2020-10-01
Payer: COMMERCIAL

## 2020-10-05 ENCOUNTER — APPOINTMENT (OUTPATIENT)
Dept: PHYSICAL THERAPY | Facility: CLINIC | Age: 40
End: 2020-10-05
Payer: COMMERCIAL

## 2020-10-08 ENCOUNTER — OFFICE VISIT (OUTPATIENT)
Dept: OBGYN CLINIC | Facility: CLINIC | Age: 40
End: 2020-10-08
Payer: COMMERCIAL

## 2020-10-08 VITALS
HEART RATE: 103 BPM | TEMPERATURE: 98 F | DIASTOLIC BLOOD PRESSURE: 72 MMHG | BODY MASS INDEX: 37.77 KG/M2 | SYSTOLIC BLOOD PRESSURE: 130 MMHG | WEIGHT: 235 LBS | HEIGHT: 66 IN

## 2020-10-08 DIAGNOSIS — S92.334D CLOSED NONDISPLACED FRACTURE OF THIRD METATARSAL BONE OF RIGHT FOOT WITH ROUTINE HEALING, SUBSEQUENT ENCOUNTER: Primary | ICD-10-CM

## 2020-10-08 DIAGNOSIS — M22.2X2 PATELLOFEMORAL DISORDER OF LEFT KNEE: ICD-10-CM

## 2020-10-08 DIAGNOSIS — M76.61 ACHILLES TENDINITIS OF RIGHT LOWER EXTREMITY: ICD-10-CM

## 2020-10-08 PROCEDURE — 99213 OFFICE O/P EST LOW 20 MIN: CPT | Performed by: STUDENT IN AN ORGANIZED HEALTH CARE EDUCATION/TRAINING PROGRAM

## 2020-10-21 NOTE — PROGRESS NOTES
PT Discharge  Today's date: 10/21/2020  Patient name: Prieto Mosqueda  : 1980  MRN: [id number]  Referring provider: Ernesto Jett MD  Dx:   Encounter Diagnosis     ICD-10-CM    1  Patellofemoral disorder of left knee  M22 2X2      Assessment/Plan    Subjective    Objective     10/21/2020  Prieto Mosqueda has not returned for more treatment  Prieto Mosqueda did not attend today's appointment  I cannot provide you with a current progress report but I can provide you with information based on previous performance  Prieto Mosqueda is discharged at this time

## 2020-10-27 ENCOUNTER — EVALUATION (OUTPATIENT)
Dept: PHYSICAL THERAPY | Facility: CLINIC | Age: 40
End: 2020-10-27
Payer: COMMERCIAL

## 2020-10-27 DIAGNOSIS — S92.334D CLOSED NONDISPLACED FRACTURE OF THIRD METATARSAL BONE OF RIGHT FOOT WITH ROUTINE HEALING, SUBSEQUENT ENCOUNTER: ICD-10-CM

## 2020-10-27 PROCEDURE — 97162 PT EVAL MOD COMPLEX 30 MIN: CPT | Performed by: PHYSICAL THERAPIST

## 2020-10-27 PROCEDURE — 97112 NEUROMUSCULAR REEDUCATION: CPT | Performed by: PHYSICAL THERAPIST

## 2020-10-27 PROCEDURE — 97535 SELF CARE MNGMENT TRAINING: CPT | Performed by: PHYSICAL THERAPIST

## 2020-10-27 PROCEDURE — 97110 THERAPEUTIC EXERCISES: CPT | Performed by: PHYSICAL THERAPIST

## 2020-10-27 PROCEDURE — 97140 MANUAL THERAPY 1/> REGIONS: CPT | Performed by: PHYSICAL THERAPIST

## 2020-10-28 ENCOUNTER — APPOINTMENT (OUTPATIENT)
Dept: PHYSICAL THERAPY | Facility: CLINIC | Age: 40
End: 2020-10-28
Payer: COMMERCIAL

## 2020-10-29 ENCOUNTER — HOSPITAL ENCOUNTER (OUTPATIENT)
Dept: RADIOLOGY | Facility: CLINIC | Age: 40
Discharge: HOME | End: 2020-10-29
Payer: COMMERCIAL

## 2020-10-29 ENCOUNTER — OFFICE VISIT (OUTPATIENT)
Dept: OBGYN CLINIC | Facility: CLINIC | Age: 40
End: 2020-10-29
Payer: COMMERCIAL

## 2020-10-29 VITALS — RESPIRATION RATE: 18 BRPM | TEMPERATURE: 96.7 F | WEIGHT: 230 LBS | BODY MASS INDEX: 36.96 KG/M2 | HEIGHT: 66 IN

## 2020-10-29 DIAGNOSIS — S92.334D CLOSED NONDISPLACED FRACTURE OF THIRD METATARSAL BONE OF RIGHT FOOT WITH ROUTINE HEALING, SUBSEQUENT ENCOUNTER: Primary | ICD-10-CM

## 2020-10-29 DIAGNOSIS — S92.334D CLOSED NONDISPLACED FRACTURE OF THIRD METATARSAL BONE OF RIGHT FOOT WITH ROUTINE HEALING, SUBSEQUENT ENCOUNTER: ICD-10-CM

## 2020-10-29 DIAGNOSIS — M76.61 ACHILLES TENDINITIS OF RIGHT LOWER EXTREMITY: ICD-10-CM

## 2020-10-29 PROCEDURE — 99213 OFFICE O/P EST LOW 20 MIN: CPT | Performed by: STUDENT IN AN ORGANIZED HEALTH CARE EDUCATION/TRAINING PROGRAM

## 2020-10-29 PROCEDURE — 73630 X-RAY EXAM OF FOOT: CPT

## 2020-11-02 ENCOUNTER — APPOINTMENT (OUTPATIENT)
Dept: PHYSICAL THERAPY | Facility: CLINIC | Age: 40
End: 2020-11-02
Payer: COMMERCIAL

## 2020-11-03 ENCOUNTER — APPOINTMENT (OUTPATIENT)
Dept: PHYSICAL THERAPY | Facility: CLINIC | Age: 40
End: 2020-11-03
Payer: COMMERCIAL

## 2020-11-04 ENCOUNTER — OFFICE VISIT (OUTPATIENT)
Dept: PHYSICAL THERAPY | Facility: CLINIC | Age: 40
End: 2020-11-04
Payer: COMMERCIAL

## 2020-11-04 DIAGNOSIS — S92.334D CLOSED NONDISPLACED FRACTURE OF THIRD METATARSAL BONE OF RIGHT FOOT WITH ROUTINE HEALING, SUBSEQUENT ENCOUNTER: Primary | ICD-10-CM

## 2020-11-04 PROCEDURE — 97110 THERAPEUTIC EXERCISES: CPT | Performed by: PHYSICAL THERAPIST

## 2020-11-04 PROCEDURE — 97140 MANUAL THERAPY 1/> REGIONS: CPT | Performed by: PHYSICAL THERAPIST

## 2020-11-04 PROCEDURE — 97112 NEUROMUSCULAR REEDUCATION: CPT | Performed by: PHYSICAL THERAPIST

## 2020-11-05 ENCOUNTER — OFFICE VISIT (OUTPATIENT)
Dept: PHYSICAL THERAPY | Facility: CLINIC | Age: 40
End: 2020-11-05
Payer: COMMERCIAL

## 2020-11-05 DIAGNOSIS — S92.334D CLOSED NONDISPLACED FRACTURE OF THIRD METATARSAL BONE OF RIGHT FOOT WITH ROUTINE HEALING, SUBSEQUENT ENCOUNTER: Primary | ICD-10-CM

## 2020-11-05 DIAGNOSIS — R26.2 DIFFICULTY WALKING: ICD-10-CM

## 2020-11-05 PROCEDURE — 97112 NEUROMUSCULAR REEDUCATION: CPT | Performed by: PHYSICAL THERAPIST

## 2020-11-05 PROCEDURE — 97110 THERAPEUTIC EXERCISES: CPT | Performed by: PHYSICAL THERAPIST

## 2020-11-05 PROCEDURE — 97140 MANUAL THERAPY 1/> REGIONS: CPT | Performed by: PHYSICAL THERAPIST

## 2020-11-09 ENCOUNTER — APPOINTMENT (OUTPATIENT)
Dept: PHYSICAL THERAPY | Facility: CLINIC | Age: 40
End: 2020-11-09
Payer: COMMERCIAL

## 2020-11-12 ENCOUNTER — APPOINTMENT (OUTPATIENT)
Dept: PHYSICAL THERAPY | Facility: CLINIC | Age: 40
End: 2020-11-12
Payer: COMMERCIAL

## 2020-11-16 ENCOUNTER — APPOINTMENT (OUTPATIENT)
Dept: PHYSICAL THERAPY | Facility: CLINIC | Age: 40
End: 2020-11-16
Payer: COMMERCIAL

## 2020-11-17 ENCOUNTER — APPOINTMENT (OUTPATIENT)
Dept: PHYSICAL THERAPY | Facility: CLINIC | Age: 40
End: 2020-11-17
Payer: COMMERCIAL

## 2020-11-19 ENCOUNTER — APPOINTMENT (OUTPATIENT)
Dept: PHYSICAL THERAPY | Facility: CLINIC | Age: 40
End: 2020-11-19
Payer: COMMERCIAL

## 2020-11-23 ENCOUNTER — APPOINTMENT (OUTPATIENT)
Dept: PHYSICAL THERAPY | Facility: CLINIC | Age: 40
End: 2020-11-23
Payer: COMMERCIAL

## 2020-11-24 ENCOUNTER — APPOINTMENT (OUTPATIENT)
Dept: PHYSICAL THERAPY | Facility: CLINIC | Age: 40
End: 2020-11-24
Payer: COMMERCIAL

## 2020-11-26 ENCOUNTER — APPOINTMENT (OUTPATIENT)
Dept: PHYSICAL THERAPY | Facility: CLINIC | Age: 40
End: 2020-11-26
Payer: COMMERCIAL

## 2020-11-30 ENCOUNTER — APPOINTMENT (OUTPATIENT)
Dept: PHYSICAL THERAPY | Facility: CLINIC | Age: 40
End: 2020-11-30
Payer: COMMERCIAL

## 2021-08-02 ENCOUNTER — HOSPITAL ENCOUNTER (EMERGENCY)
Facility: HOSPITAL | Age: 41
Discharge: HOME | End: 2021-08-02
Attending: EMERGENCY MEDICINE | Admitting: EMERGENCY MEDICINE
Payer: COMMERCIAL

## 2021-08-02 VITALS
SYSTOLIC BLOOD PRESSURE: 142 MMHG | HEART RATE: 82 BPM | TEMPERATURE: 98.3 F | RESPIRATION RATE: 16 BRPM | OXYGEN SATURATION: 97 % | HEIGHT: 66 IN | WEIGHT: 230 LBS | DIASTOLIC BLOOD PRESSURE: 82 MMHG | BODY MASS INDEX: 36.96 KG/M2

## 2021-08-02 DIAGNOSIS — J01.00 ACUTE NON-RECURRENT MAXILLARY SINUSITIS: Primary | ICD-10-CM

## 2021-08-02 DIAGNOSIS — G43.909 MIGRAINE: ICD-10-CM

## 2021-08-02 PROCEDURE — 96361 HYDRATE IV INFUSION ADD-ON: CPT

## 2021-08-02 PROCEDURE — 96375 TX/PRO/DX INJ NEW DRUG ADDON: CPT

## 2021-08-02 PROCEDURE — 96374 THER/PROPH/DIAG INJ IV PUSH: CPT

## 2021-08-02 PROCEDURE — 99284 EMERGENCY DEPT VISIT MOD MDM: CPT | Performed by: EMERGENCY MEDICINE

## 2021-08-02 PROCEDURE — 99283 EMERGENCY DEPT VISIT LOW MDM: CPT

## 2021-08-02 RX ORDER — DIPHENHYDRAMINE HYDROCHLORIDE 50 MG/ML
25 INJECTION INTRAMUSCULAR; INTRAVENOUS ONCE
Status: COMPLETED | OUTPATIENT
Start: 2021-08-02 | End: 2021-08-02

## 2021-08-02 RX ORDER — CEFUROXIME AXETIL 500 MG/1
500 TABLET ORAL EVERY 12 HOURS SCHEDULED
Qty: 20 TABLET | Refills: 0 | Status: SHIPPED | OUTPATIENT
Start: 2021-08-02 | End: 2021-08-12

## 2021-08-02 RX ORDER — KETOROLAC TROMETHAMINE 30 MG/ML
15 INJECTION, SOLUTION INTRAMUSCULAR; INTRAVENOUS ONCE
Status: COMPLETED | OUTPATIENT
Start: 2021-08-02 | End: 2021-08-02

## 2021-08-02 RX ORDER — ONDANSETRON 2 MG/ML
4 INJECTION INTRAMUSCULAR; INTRAVENOUS ONCE
Status: COMPLETED | OUTPATIENT
Start: 2021-08-02 | End: 2021-08-02

## 2021-08-02 RX ORDER — METOCLOPRAMIDE HYDROCHLORIDE 5 MG/ML
10 INJECTION INTRAMUSCULAR; INTRAVENOUS ONCE
Status: COMPLETED | OUTPATIENT
Start: 2021-08-02 | End: 2021-08-02

## 2021-08-02 RX ORDER — BUTALBITAL, ACETAMINOPHEN AND CAFFEINE 50; 325; 40 MG/1; MG/1; MG/1
1 TABLET ORAL EVERY 4 HOURS PRN
Qty: 10 TABLET | Refills: 0 | Status: SHIPPED | OUTPATIENT
Start: 2021-08-02

## 2021-08-02 RX ORDER — DEXAMETHASONE SODIUM PHOSPHATE 10 MG/ML
10 INJECTION, SOLUTION INTRAMUSCULAR; INTRAVENOUS ONCE
Status: COMPLETED | OUTPATIENT
Start: 2021-08-02 | End: 2021-08-02

## 2021-08-02 RX ORDER — BUTALBITAL, ACETAMINOPHEN AND CAFFEINE 50; 325; 40 MG/1; MG/1; MG/1
1 TABLET ORAL EVERY 4 HOURS PRN
Qty: 10 TABLET | Refills: 0 | Status: SHIPPED | OUTPATIENT
Start: 2021-08-02 | End: 2021-08-02 | Stop reason: SDUPTHER

## 2021-08-02 RX ADMIN — ONDANSETRON 4 MG: 2 INJECTION INTRAMUSCULAR; INTRAVENOUS at 22:20

## 2021-08-02 RX ADMIN — KETOROLAC TROMETHAMINE 15 MG: 30 INJECTION, SOLUTION INTRAMUSCULAR at 22:23

## 2021-08-02 RX ADMIN — METOCLOPRAMIDE HYDROCHLORIDE 10 MG: 5 INJECTION INTRAMUSCULAR; INTRAVENOUS at 22:23

## 2021-08-02 RX ADMIN — DIPHENHYDRAMINE HYDROCHLORIDE 25 MG: 50 INJECTION, SOLUTION INTRAMUSCULAR; INTRAVENOUS at 22:22

## 2021-08-02 RX ADMIN — SODIUM CHLORIDE 500 ML: 0.9 INJECTION, SOLUTION INTRAVENOUS at 22:12

## 2021-08-02 RX ADMIN — DEXAMETHASONE SODIUM PHOSPHATE 10 MG: 10 INJECTION, SOLUTION INTRAMUSCULAR; INTRAVENOUS at 22:22

## 2021-08-03 NOTE — ED PROVIDER NOTES
History  Chief Complaint   Patient presents with    Headache - Recurrent or Known Dx Migraines     pt has hx of migraines  been taking double dose of sumatriptin for migraine that she has had for ten days without relief  pt c/o severe sinus pressure and headache      Patient has had sinus congestion  Has history of migraines  Feels like her past migraines  Pain is pressure behind the eyes and in the face  On both sides but mainly on the left  Imitrex that any relief  No trauma  No fevers or chills  Slight nausea but no vomiting or diarrhea  Complains of slight ear pain  No throat pain  Imitrex and over-the-counter medicine without any relief  History provided by:  Patient   used: No    Headache - Recurrent or Known Dx Migraines  Pain location:  Generalized  Quality:  Dull  Onset quality:  Gradual  Duration:  10 days  Timing:  Constant  Progression:  Unchanged  Chronicity:  New  Similar to prior headaches: yes    Context: not exposure to bright light, not eating and not loud noise    Relieved by:  Nothing  Worsened by:  Nothing  Ineffective treatments:  Acetaminophen, NSAIDs and prescription medications (Over-the-counter medications)  Associated symptoms: congestion, ear pain, facial pain and nausea    Associated symptoms: no abdominal pain, no blurred vision, no cough, no diarrhea, no dizziness, no eye pain, no fever, no hearing loss, no loss of balance, no myalgias, no neck pain, no neck stiffness, no numbness, no seizures, no sore throat, no syncope, no visual change and no vomiting        Prior to Admission Medications   Prescriptions Last Dose Informant Patient Reported? Taking?    Echinacea-Goldenseal (ECHINACEA COMB/REHMAN SEAL) CAPS   Yes Yes   Sig: Take by mouth   Magnesium 500 MG CAPS   Yes Yes   Sig: Take 500 mg by mouth   Multiple Vitamins-Minerals (MULTIVITAMIN ADULT) CHEW   Yes Yes   Sig: Chew   Riboflavin 100 MG CAPS   Yes Yes   Sig: Take 100 mg by mouth SUMAtriptan (Imitrex) 6 mg/0 5 mL   Yes Yes   diclofenac sodium (VOLTAREN) 1 %   No Yes   Sig: Apply 2 g topically 4 (four) times a day   fluticasone (FLONASE) 50 mcg/act nasal spray   Yes Yes   Sig: SPRAY 2 SPRAYS INTO EACH NOSTRIL EVERY DAY   ibuprofen (MOTRIN) 800 mg tablet   Yes Yes   Sig: Take 800 mg by mouth every 8 (eight) hours as needed   meloxicam (MOBIC) 7 5 mg tablet   No Yes   Sig: Take 1 tablet (7 5 mg total) by mouth daily   propranolol (INDERAL) 20 mg tablet   Yes Yes   Sig: Take 20 mg by mouth      Facility-Administered Medications: None       Past Medical History:   Diagnosis Date    Hypertension     Migraines        Past Surgical History:   Procedure Laterality Date    TUBAL LIGATION         Family History   Problem Relation Age of Onset    Heart disease Mother     Hypertension Mother     Hyperlipidemia Mother     Hodgkin's lymphoma Father      I have reviewed and agree with the history as documented  E-Cigarette/Vaping    E-Cigarette Use Never User      E-Cigarette/Vaping Substances    Nicotine No     THC No     CBD No     Flavoring No     Other No      Social History     Tobacco Use    Smoking status: Never Smoker    Smokeless tobacco: Never Used   Vaping Use    Vaping Use: Never used   Substance Use Topics    Alcohol use: Not Currently    Drug use: Never       Review of Systems   Constitutional: Negative for chills and fever  HENT: Positive for congestion, ear pain and rhinorrhea  Negative for hearing loss, sore throat, trouble swallowing and voice change  Eyes: Negative for blurred vision, pain and discharge  Respiratory: Negative for cough, shortness of breath and wheezing  Cardiovascular: Negative for chest pain, palpitations and syncope  Gastrointestinal: Positive for nausea  Negative for abdominal pain, blood in stool, constipation, diarrhea and vomiting  Genitourinary: Negative for dysuria, flank pain, frequency and hematuria     Musculoskeletal: Negative for joint swelling, myalgias, neck pain and neck stiffness  Skin: Negative for rash and wound  Neurological: Positive for headaches  Negative for dizziness, seizures, syncope, facial asymmetry, numbness and loss of balance  Psychiatric/Behavioral: Negative for hallucinations, self-injury and suicidal ideas  All other systems reviewed and are negative  Physical Exam  Physical Exam  Vitals and nursing note reviewed  Constitutional:       General: She is not in acute distress  Appearance: She is well-developed  HENT:      Head: Normocephalic and atraumatic  Right Ear: External ear normal       Left Ear: External ear normal    Eyes:      General: No scleral icterus  Right eye: No discharge  Left eye: No discharge  Extraocular Movements: Extraocular movements intact  Conjunctiva/sclera: Conjunctivae normal    Cardiovascular:      Rate and Rhythm: Normal rate and regular rhythm  Heart sounds: Normal heart sounds  No murmur heard  Pulmonary:      Effort: Pulmonary effort is normal       Breath sounds: Normal breath sounds  No wheezing or rales  Abdominal:      General: Bowel sounds are normal  There is no distension  Palpations: Abdomen is soft  Tenderness: There is no abdominal tenderness  There is no guarding or rebound  Musculoskeletal:         General: No deformity  Normal range of motion  Cervical back: Normal range of motion and neck supple  Skin:     General: Skin is warm and dry  Findings: No rash  Neurological:      General: No focal deficit present  Mental Status: She is alert and oriented to person, place, and time  Cranial Nerves: No cranial nerve deficit  Psychiatric:         Mood and Affect: Mood normal          Behavior: Behavior normal          Thought Content:  Thought content normal          Judgment: Judgment normal          Vital Signs  ED Triage Vitals   Temperature Pulse Respirations Blood Pressure SpO2   08/02/21 2139 08/02/21 2139 08/02/21 2139 08/02/21 2139 08/02/21 2139   98 3 °F (36 8 °C) 99 18 (!) 158/114 96 %      Temp Source Heart Rate Source Patient Position - Orthostatic VS BP Location FiO2 (%)   08/02/21 2139 08/02/21 2139 08/02/21 2139 08/02/21 2139 --   Temporal Monitor Sitting Right arm       Pain Score       08/02/21 2223       6           Vitals:    08/02/21 2139   BP: (!) 158/114   Pulse: 99   Patient Position - Orthostatic VS: Sitting         Visual Acuity      ED Medications  Medications   sodium chloride 0 9 % bolus 500 mL (500 mL Intravenous New Bag 8/2/21 2212)   ketorolac (TORADOL) injection 15 mg (15 mg Intravenous Given 8/2/21 2223)   metoclopramide (REGLAN) injection 10 mg (10 mg Intravenous Given 8/2/21 2223)   diphenhydrAMINE (BENADRYL) injection 25 mg (25 mg Intravenous Given 8/2/21 2222)   dexamethasone (PF) (DECADRON) injection 10 mg (10 mg Intravenous Given 8/2/21 2222)   ondansetron (ZOFRAN) injection 4 mg (4 mg Intravenous Given 8/2/21 2220)       Diagnostic Studies  Results Reviewed     None                 No orders to display              Procedures  Procedures         ED Course  ED Course as of Aug 02 2224   Mon Aug 02, 2021   2223 Signed out to Dr Maria Eugenia Nguyen 20yo+      Most Recent Value   SBIRT (23 yo +)   In order to provide better care to our patients, we are screening all of our patients for alcohol and drug use  Would it be okay to ask you these screening questions?   Unable to answer at this time Filed at: 08/02/2021 2140                    MDM      Disposition  Final diagnoses:   Acute non-recurrent maxillary sinusitis   Migraine     Time reflects when diagnosis was documented in both MDM as applicable and the Disposition within this note     Time User Action Codes Description Comment    8/2/2021  9:52 PM Jose Leavitt Add [J01 00] Acute non-recurrent maxillary sinusitis     8/2/2021  9:52 PM Jose Leavitt Add [K90 013] Migraine       ED Disposition     ED Disposition Condition Date/Time Comment    Discharge Stable Mon Aug 2, 2021  9:52 PM Amy Ridleyor discharge to home/self care  Follow-up Information     Follow up With Specialties Details Why Contact Info    Carisa Ambriz DO Family Medicine Call in 2 days  [address]  [address]  [phone]            Patient's Medications   Discharge Prescriptions    BUTALBITAL-ACETAMINOPHEN-CAFFEINE (FIORICET,ESGIC) -40 MG PER TABLET    Take 1 tablet by mouth every 4 (four) hours as needed for headaches for up to 10 doses       Start Date: 8/2/2021  End Date: --       Order Dose: 1 tablet       Quantity: 10 tablet    Refills: 0    CEFUROXIME (CEFTIN) 500 MG TABLET    Take 1 tablet (500 mg total) by mouth every 12 (twelve) hours for 10 days       Start Date: 8/2/2021  End Date: 8/12/2021       Order Dose: 500 mg       Quantity: 20 tablet    Refills: 0     No discharge procedures on file      PDMP Review     None          ED Provider  Electronically Signed by           Magdiel Heck MD  08/02/21 9969

## 2021-08-03 NOTE — ED CARE HANDOFF
Emergency Department Sign Out Note        Sign out and transfer of care from Dr Lizzie Hastings  See Separate Emergency Department note  The patient, Latasha Cortes, was evaluated by the previous provider for headache/sinus congestion  Workup Completed:  IV fluids/meds    ED Course / Workup Pending (followup): On re-evaluation patient reports significant improvement of symptoms, feels ready to be discharged at home at this point in time, has a referral to see a neurologist, in the meantime being treated for sinusitis and migraine headaches with medications that were previously sent to the pharmacy                                     Procedures  MDM    Disposition  Final diagnoses:   Acute non-recurrent maxillary sinusitis   Migraine     Time reflects when diagnosis was documented in both MDM as applicable and the Disposition within this note     Time User Action Codes Description Comment    8/2/2021  9:52 PM Carol Ann Leavitt Add [J01 00] Acute non-recurrent maxillary sinusitis     8/2/2021  9:52 PM Latrice Lundberg Add [B57 527] Migraine       ED Disposition     ED Disposition Condition Date/Time Comment    Discharge Stable Mon Aug 2, 2021  9:52 PM Latasha Cortes discharge to home/self care              Follow-up Information     Follow up With Specialties Details Why Contact Annalise Gonzalez DO Family Medicine Call in 2 days  [address]  [address] [place] [address]  [phone]          Patient's Medications   Discharge Prescriptions    BUTALBITAL-ACETAMINOPHEN-CAFFEINE (FIORICET,ESGIC) -40 MG PER TABLET    Take 1 tablet by mouth every 4 (four) hours as needed for headaches for up to 10 doses       Start Date: 8/2/2021  End Date: --       Order Dose: 1 tablet       Quantity: 10 tablet    Refills: 0    CEFUROXIME (CEFTIN) 500 MG TABLET    Take 1 tablet (500 mg total) by mouth every 12 (twelve) hours for 10 days       Start Date: 8/2/2021  End Date: 8/12/2021       Order Dose: 500 mg       Quantity: 20 tablet    Refills: 0     No discharge procedures on file         ED Provider  Electronically Signed by     Jenness Gowers, DO  08/02/21 3576

## 2021-09-15 DIAGNOSIS — Z12.31 ENCOUNTER FOR SCREENING MAMMOGRAM FOR MALIGNANT NEOPLASM OF BREAST: ICD-10-CM

## 2021-09-21 ENCOUNTER — HOSPITAL ENCOUNTER (OUTPATIENT)
Dept: RADIOLOGY | Facility: CLINIC | Age: 41
Discharge: HOME | End: 2021-09-21
Payer: COMMERCIAL

## 2021-09-21 VITALS — BODY MASS INDEX: 39.37 KG/M2 | HEIGHT: 66 IN | WEIGHT: 245 LBS

## 2021-09-21 DIAGNOSIS — Z12.31 ENCOUNTER FOR SCREENING MAMMOGRAM FOR MALIGNANT NEOPLASM OF BREAST: ICD-10-CM

## 2021-09-21 PROCEDURE — 77063 BREAST TOMOSYNTHESIS BI: CPT

## 2021-09-21 PROCEDURE — 77067 SCR MAMMO BI INCL CAD: CPT

## 2022-12-09 ENCOUNTER — HOSPITAL ENCOUNTER (EMERGENCY)
Facility: HOSPITAL | Age: 42
Discharge: HOME | End: 2022-12-09
Attending: EMERGENCY MEDICINE

## 2022-12-09 VITALS
BODY MASS INDEX: 37.77 KG/M2 | HEART RATE: 98 BPM | HEIGHT: 66 IN | SYSTOLIC BLOOD PRESSURE: 142 MMHG | OXYGEN SATURATION: 95 % | DIASTOLIC BLOOD PRESSURE: 74 MMHG | RESPIRATION RATE: 18 BRPM | TEMPERATURE: 98.5 F | WEIGHT: 235 LBS

## 2022-12-09 DIAGNOSIS — R68.89 FLU-LIKE SYMPTOMS: Primary | ICD-10-CM

## 2022-12-09 LAB
FLUAV RNA RESP QL NAA+PROBE: POSITIVE
FLUBV RNA RESP QL NAA+PROBE: NEGATIVE
RSV RNA RESP QL NAA+PROBE: NEGATIVE
SARS-COV-2 RNA RESP QL NAA+PROBE: NEGATIVE

## 2022-12-09 NOTE — ED PROVIDER NOTES
History  Chief Complaint   Patient presents with   • Flu Symptoms     Chest congestion, chest pain, SOB, back pain, body aches, nausea, fatigue, chills since last night  Diarrhea x 1 today  History provided by:  Medical records and patient  URI  Presenting symptoms: cough, fatigue, fever, rhinorrhea and sore throat    Presenting symptoms: no ear pain    Severity:  Mild  Onset quality:  Gradual  Duration:  1 day  Timing:  Constant  Progression:  Unchanged  Chronicity:  New  Relieved by:  Nothing  Worsened by:  Nothing  Ineffective treatments:  None tried  Associated symptoms: arthralgias, headaches, myalgias and sinus pain    Associated symptoms: no sneezing, no swollen glands and no wheezing        Prior to Admission Medications   Prescriptions Last Dose Informant Patient Reported? Taking?    Echinacea-Goldenseal (ECHINACEA COMB/REHMAN SEAL) CAPS   Yes No   Sig: Take by mouth   Magnesium 500 MG CAPS   Yes No   Sig: Take 500 mg by mouth   Multiple Vitamins-Minerals (MULTIVITAMIN ADULT) CHEW   Yes No   Sig: Chew   Riboflavin 100 MG CAPS   Yes No   Sig: Take 100 mg by mouth   SUMAtriptan (Imitrex) 6 mg/0 5 mL   Yes No   butalbital-acetaminophen-caffeine (FIORICET,ESGIC) -40 mg per tablet   No No   Sig: Take 1 tablet by mouth every 4 (four) hours as needed for headaches for up to 10 doses   diclofenac sodium (VOLTAREN) 1 %   No No   Sig: Apply 2 g topically 4 (four) times a day   fluticasone (FLONASE) 50 mcg/act nasal spray   Yes No   Sig: SPRAY 2 SPRAYS INTO EACH NOSTRIL EVERY DAY   ibuprofen (MOTRIN) 800 mg tablet   Yes No   Sig: Take 800 mg by mouth every 8 (eight) hours as needed   meloxicam (MOBIC) 7 5 mg tablet   No No   Sig: Take 1 tablet (7 5 mg total) by mouth daily   propranolol (INDERAL) 20 mg tablet   Yes No   Sig: Take 20 mg by mouth      Facility-Administered Medications: None       Past Medical History:   Diagnosis Date   • Hypertension    • Migraines        Past Surgical History:   Procedure Laterality Date   • TUBAL LIGATION         Family History   Problem Relation Age of Onset   • Heart disease Mother    • Hypertension Mother    • Hyperlipidemia Mother    • Hodgkin's lymphoma Father    • No Known Problems Maternal Grandmother    • No Known Problems Maternal Grandfather    • No Known Problems Paternal Grandmother    • No Known Problems Paternal Grandfather    • No Known Problems Half-Sister    • No Known Problems Half-Sister    • No Known Problems Half-Sister    • No Known Problems Maternal Aunt    • No Known Problems Paternal Aunt    • No Known Problems Paternal Aunt      I have reviewed and agree with the history as documented  E-Cigarette/Vaping   • E-Cigarette Use Never User      E-Cigarette/Vaping Substances   • Nicotine No    • THC No    • CBD No    • Flavoring No    • Other No      Social History     Tobacco Use   • Smoking status: Never   • Smokeless tobacco: Never   Vaping Use   • Vaping Use: Never used   Substance Use Topics   • Alcohol use: Not Currently   • Drug use: Not Currently     Types: Marijuana       Review of Systems   Constitutional: Positive for fatigue and fever  Negative for chills  HENT: Positive for rhinorrhea, sinus pain and sore throat  Negative for ear discharge, ear pain and sneezing  Eyes: Negative for pain and visual disturbance  Respiratory: Positive for cough  Negative for shortness of breath and wheezing  Cardiovascular: Negative for chest pain and palpitations  Gastrointestinal: Negative for abdominal pain, diarrhea, nausea and vomiting  Endocrine: Negative for polydipsia, polyphagia and polyuria  Genitourinary: Negative for difficulty urinating, dysuria, flank pain and hematuria  Musculoskeletal: Positive for arthralgias and myalgias  Negative for back pain  Skin: Negative for color change and rash  Allergic/Immunologic: Negative for immunocompromised state  Neurological: Positive for headaches   Negative for dizziness, seizures, syncope and weakness  Psychiatric/Behavioral: Negative for confusion and self-injury  The patient is not nervous/anxious  All other systems reviewed and are negative  Physical Exam  Physical Exam  Constitutional:       General: She is not in acute distress  Appearance: Normal appearance  She is not ill-appearing, toxic-appearing or diaphoretic  HENT:      Head: Normocephalic and atraumatic  Right Ear: Tympanic membrane normal       Left Ear: Tympanic membrane normal       Nose: Rhinorrhea present  No congestion  Mouth/Throat:      Mouth: Mucous membranes are moist       Pharynx: Oropharynx is clear  Posterior oropharyngeal erythema present  No oropharyngeal exudate  Comments:  done in triage shows normal sinus rhythm without acute ischemia  Mild posterior cobblestoning  Eyes:      General:         Right eye: No discharge  Left eye: No discharge  Cardiovascular:      Rate and Rhythm: Normal rate and regular rhythm  Pulses: Normal pulses  Heart sounds: Normal heart sounds  No murmur heard  No gallop  Pulmonary:      Effort: Pulmonary effort is normal  No respiratory distress  Breath sounds: Normal breath sounds  No stridor  No wheezing, rhonchi or rales  Chest:      Chest wall: No tenderness  Abdominal:      General: Bowel sounds are normal  There is no distension  Palpations: Abdomen is soft  There is no mass  Tenderness: There is no abdominal tenderness  There is no right CVA tenderness, left CVA tenderness, guarding or rebound  Hernia: No hernia is present  Musculoskeletal:         General: Normal range of motion  Cervical back: Normal range of motion and neck supple  Skin:     General: Skin is warm and dry  Capillary Refill: Capillary refill takes less than 2 seconds  Neurological:      General: No focal deficit present  Mental Status: She is alert and oriented to person, place, and time        Cranial Nerves: No cranial nerve deficit  Sensory: No sensory deficit  Motor: No weakness  Coordination: Coordination normal       Gait: Gait normal       Deep Tendon Reflexes: Reflexes normal    Psychiatric:         Mood and Affect: Mood normal          Behavior: Behavior normal          Thought Content: Thought content normal          Judgment: Judgment normal          Vital Signs  ED Triage Vitals [12/09/22 1711]   Temperature Pulse Respirations Blood Pressure SpO2   98 5 °F (36 9 °C) 104 18 (!) 179/97 98 %      Temp Source Heart Rate Source Patient Position - Orthostatic VS BP Location FiO2 (%)   Temporal Monitor Sitting Left arm --      Pain Score       --           Vitals:    12/09/22 1711 12/09/22 1826   BP: (!) 179/97 142/74   Pulse: 104 98   Patient Position - Orthostatic VS: Sitting Lying         Visual Acuity      ED Medications  Medications - No data to display    Diagnostic Studies  Results Reviewed     Procedure Component Value Units Date/Time    FLU/RSV/COVID - if FLU/RSV clinically relevant [[id number]] Collected: 12/09/22 1824    Lab Status: In process Specimen: Nares from Nasopharyngeal Swab Updated: 12/09/22 1829                 No orders to display              Procedures  Procedures         ED Course  ED Course as of 12/09/22 1913   Fri Dec 09, 2022   6834 1804:  Patient appears well, vital signs reviewed  Patient appears to be suffering from viral URI  No respiratory distress  Send COVID/flu/RSV PCR  Symptomatic care instructed  EKG done in triage shows NSR without acute ischemia  SBIRT 22yo+    Flowsheet Row Most Recent Value   SBIRT (23 yo +)    In order to provide better care to our patients, we are screening all of our patients for alcohol and drug use  Would it be okay to ask you these screening questions?  No Filed at: 12/09/2022 1713                    MDM    Disposition  Final diagnoses:   Flu-like symptoms     Time reflects when diagnosis was documented in both MDM as applicable and the Disposition within this note     Time User Action Codes Description Comment    12/9/2022  6:40 PM Jaylene Bernheim Add [R68 89] Flu-like symptoms       ED Disposition     ED Disposition   Discharge    Condition   Stable    Date/Time   Fri Dec 9, 2022  6:40 PM    Comment   Rain Galeano discharge to home/self care  Follow-up Information     Follow up With Specialties Details Why Contact Info    Rosalia Villalba DO Family Medicine Schedule an appointment as soon as possible for a visit   [address] [facility] [address]  [phone]            Discharge Medication List as of 12/9/2022  6:41 PM      CONTINUE these medications which have NOT CHANGED    Details   butalbital-acetaminophen-caffeine (FIORICET,ESGIC) -40 mg per tablet Take 1 tablet by mouth every 4 (four) hours as needed for headaches for up to 10 doses, Starting Mon 8/2/2021, Normal      diclofenac sodium (VOLTAREN) 1 % Apply 2 g topically 4 (four) times a day, Starting Thu 9/24/2020, Normal      Echinacea-Goldenseal (ECHINACEA COMB/REHMAN SEAL) CAPS Take by mouth, Historical Med      fluticasone (FLONASE) 50 mcg/act nasal spray SPRAY 2 SPRAYS INTO EACH NOSTRIL EVERY DAY, Historical Med      ibuprofen (MOTRIN) 800 mg tablet Take 800 mg by mouth every 8 (eight) hours as needed, Starting Tue 8/11/2020, Historical Med      Magnesium 500 MG CAPS Take 500 mg by mouth, Historical Med      meloxicam (MOBIC) 7 5 mg tablet Take 1 tablet (7 5 mg total) by mouth daily, Starting Thu 9/10/2020, Normal      Multiple Vitamins-Minerals (MULTIVITAMIN ADULT) CHEW Chew, Historical Med      propranolol (INDERAL) 20 mg tablet Take 20 mg by mouth, Starting Wed 1/15/2020, Historical Med      Riboflavin 100 MG CAPS Take 100 mg by mouth, Historical Med      SUMAtriptan (Imitrex) 6 mg/0 5 mL Historical Med             No discharge procedures on file      PDMP Review     None          ED Provider  Electronically Signed by Anusha Hinojosa MD  12/09/22 1615

## 2022-12-09 NOTE — Clinical Note
John Braswell was seen and treated in our emergency department on 12/9/2022  Diagnosis:     Eva Blake  may return to work on return date  She may return on this date: 12/12/2022    If fever free for 48 hours  If you have any questions or concerns, please don't hesitate to call        Etta Mustafa MD    ______________________________           _______________          _______________  Hospital Representative                              Date                                Time

## 2022-12-11 LAB
ATRIAL RATE: 99 BPM
P AXIS: 69 DEGREES
PR INTERVAL: 134 MS
QRS AXIS: 65 DEGREES
QRSD INTERVAL: 94 MS
QT INTERVAL: 348 MS
QTC INTERVAL: 446 MS
T WAVE AXIS: 37 DEGREES
VENTRICULAR RATE: 99 BPM

## 2023-03-02 PROBLEM — G43.909 MIGRAINE WITHOUT STATUS MIGRAINOSUS, NOT INTRACTABLE: Status: ACTIVE | Noted: 2017-02-02

## 2023-03-02 PROBLEM — I10 HTN, GOAL BELOW 140/90: Status: ACTIVE | Noted: 2019-02-18

## 2023-03-02 PROBLEM — A60.00 HERPES SIMPLEX INFECTION OF GENITOURINARY SYSTEM: Status: ACTIVE | Noted: 2017-02-02

## 2023-04-04 ENCOUNTER — HOSPITAL ENCOUNTER (OUTPATIENT)
Dept: RADIOLOGY | Facility: CLINIC | Age: 43
Discharge: HOME | End: 2023-04-04

## 2023-04-04 VITALS — WEIGHT: 230 LBS | HEIGHT: 66 IN | BODY MASS INDEX: 36.96 KG/M2

## 2023-04-04 DIAGNOSIS — Z12.31 ENCOUNTER FOR SCREENING MAMMOGRAM FOR MALIGNANT NEOPLASM OF BREAST: ICD-10-CM

## 2024-10-07 ENCOUNTER — HOSPITAL ENCOUNTER (OUTPATIENT)
Dept: CT IMAGING | Facility: HOSPITAL | Age: 44
Discharge: HOME | End: 2024-10-07
Payer: COMMERCIAL

## 2024-10-07 DIAGNOSIS — R14.0 ABDOMINAL DISTENSION (GASEOUS): ICD-10-CM

## 2024-10-07 DIAGNOSIS — K62.5 HEMORRHAGE OF ANUS AND RECTUM: ICD-10-CM

## 2024-10-07 DIAGNOSIS — R10.84 GENERALIZED ABDOMINAL PAIN: ICD-10-CM

## 2024-10-07 PROCEDURE — 74177 CT ABD & PELVIS W/CONTRAST: CPT

## 2024-10-07 RX ADMIN — IOHEXOL 75 ML: 350 INJECTION, SOLUTION INTRAVENOUS at 17:27

## 2024-10-07 RX ADMIN — IOHEXOL 50 ML: 240 INJECTION, SOLUTION INTRATHECAL; INTRAVASCULAR; INTRAVENOUS; ORAL at 17:27

## 2024-10-25 ENCOUNTER — HOSPITAL ENCOUNTER (OUTPATIENT)
Dept: RADIOLOGY | Facility: CLINIC | Age: 44
Discharge: HOME | End: 2024-10-25

## 2024-10-25 DIAGNOSIS — Z12.31 ENCOUNTER FOR SCREENING MAMMOGRAM FOR MALIGNANT NEOPLASM OF BREAST: ICD-10-CM
